# Patient Record
Sex: MALE | Race: WHITE | Employment: OTHER | URBAN - METROPOLITAN AREA
[De-identification: names, ages, dates, MRNs, and addresses within clinical notes are randomized per-mention and may not be internally consistent; named-entity substitution may affect disease eponyms.]

---

## 2018-07-16 ENCOUNTER — HOSPITAL ENCOUNTER (OUTPATIENT)
Dept: NON INVASIVE DIAGNOSTICS | Age: 53
Discharge: HOME | End: 2018-07-16
Payer: COMMERCIAL

## 2018-07-16 ENCOUNTER — HOSPITAL ENCOUNTER (OUTPATIENT)
Dept: NUCLEAR MEDICINE | Age: 53
Discharge: HOME | End: 2018-07-16
Payer: COMMERCIAL

## 2018-07-16 VITALS — HEIGHT: 72 IN | WEIGHT: 250 LBS | BODY MASS INDEX: 33.86 KG/M2

## 2018-07-16 LAB
LV EF: 61 %
LVEF MODALITY: NORMAL

## 2018-07-16 PROCEDURE — A9500 TC99M SESTAMIBI: HCPCS | Performed by: RADIOLOGY

## 2018-07-16 PROCEDURE — 3430000000 HC RX DIAGNOSTIC RADIOPHARMACEUTICAL: Performed by: RADIOLOGY

## 2018-07-16 PROCEDURE — 93017 CV STRESS TEST TRACING ONLY: CPT

## 2018-07-16 PROCEDURE — 78452 HT MUSCLE IMAGE SPECT MULT: CPT

## 2018-07-16 RX ADMIN — Medication 10 MILLICURIE: at 08:38

## 2018-07-16 RX ADMIN — Medication 30 MILLICURIE: at 08:38

## 2019-03-13 ENCOUNTER — TELEPHONE (OUTPATIENT)
Dept: ADMINISTRATIVE | Age: 54
End: 2019-03-13

## 2019-03-15 ENCOUNTER — OFFICE VISIT (OUTPATIENT)
Dept: CARDIOLOGY CLINIC | Age: 54
End: 2019-03-15
Payer: COMMERCIAL

## 2019-03-15 VITALS
HEART RATE: 60 BPM | SYSTOLIC BLOOD PRESSURE: 128 MMHG | RESPIRATION RATE: 16 BRPM | HEIGHT: 72 IN | WEIGHT: 259 LBS | BODY MASS INDEX: 35.08 KG/M2 | DIASTOLIC BLOOD PRESSURE: 82 MMHG

## 2019-03-15 DIAGNOSIS — I10 HYPERTENSION, UNSPECIFIED TYPE: ICD-10-CM

## 2019-03-15 DIAGNOSIS — Z92.89 HISTORY OF NUCLEAR STRESS TEST: ICD-10-CM

## 2019-03-15 DIAGNOSIS — I42.9 CARDIOMYOPATHY, UNSPECIFIED TYPE (HCC): Primary | ICD-10-CM

## 2019-03-15 PROCEDURE — 99244 OFF/OP CNSLTJ NEW/EST MOD 40: CPT | Performed by: INTERNAL MEDICINE

## 2019-03-15 PROCEDURE — 93000 ELECTROCARDIOGRAM COMPLETE: CPT | Performed by: INTERNAL MEDICINE

## 2019-03-15 RX ORDER — LISINOPRIL 20 MG/1
TABLET ORAL
Refills: 1 | COMMUNITY
Start: 2019-03-07 | End: 2021-04-08 | Stop reason: ALTCHOICE

## 2019-03-15 RX ORDER — ROSUVASTATIN CALCIUM 5 MG/1
5 TABLET, COATED ORAL DAILY
Refills: 0 | COMMUNITY
Start: 2019-01-07

## 2019-03-15 RX ORDER — OMEPRAZOLE 40 MG/1
40 CAPSULE, DELAYED RELEASE ORAL
Status: ON HOLD | COMMUNITY
Start: 2019-03-06 | End: 2021-01-02

## 2020-11-18 ENCOUNTER — NURSE ONLY (OUTPATIENT)
Dept: PRIMARY CARE CLINIC | Age: 55
End: 2020-11-18

## 2020-11-18 DIAGNOSIS — Z20.822 ENCOUNTER FOR LABORATORY TESTING FOR COVID-19 VIRUS: ICD-10-CM

## 2020-11-20 LAB
SARS-COV-2: NOT DETECTED
SOURCE: NORMAL

## 2021-01-02 ENCOUNTER — HOSPITAL ENCOUNTER (OUTPATIENT)
Age: 56
Setting detail: OBSERVATION
Discharge: HOME | End: 2021-01-03
Attending: EMERGENCY MEDICINE | Admitting: INTERNAL MEDICINE
Payer: COMMERCIAL

## 2021-01-02 ENCOUNTER — APPOINTMENT (OUTPATIENT)
Dept: GENERAL RADIOLOGY | Age: 56
End: 2021-01-02
Payer: COMMERCIAL

## 2021-01-02 DIAGNOSIS — I48.91 NEW ONSET A-FIB (HCC): Primary | ICD-10-CM

## 2021-01-02 PROBLEM — E78.5 HLD (HYPERLIPIDEMIA): Status: ACTIVE | Noted: 2021-01-02

## 2021-01-02 PROBLEM — K21.9 GASTROESOPHAGEAL REFLUX DISEASE WITHOUT ESOPHAGITIS: Status: ACTIVE | Noted: 2021-01-02

## 2021-01-02 LAB
ANION GAP SERPL CALCULATED.3IONS-SCNC: 6 MMOL/L (ref 7–16)
BASOPHILS ABSOLUTE: 0.03 E9/L (ref 0–0.2)
BASOPHILS RELATIVE PERCENT: 0.4 % (ref 0–2)
BUN BLDV-MCNC: 19 MG/DL (ref 6–20)
CALCIUM SERPL-MCNC: 9.7 MG/DL (ref 8.6–10.2)
CHLORIDE BLD-SCNC: 104 MMOL/L (ref 98–107)
CO2: 25 MMOL/L (ref 22–29)
CREAT SERPL-MCNC: 0.9 MG/DL (ref 0.7–1.2)
EKG ATRIAL RATE: 250 BPM
EKG Q-T INTERVAL: 338 MS
EKG QRS DURATION: 86 MS
EKG QTC CALCULATION (BAZETT): 417 MS
EKG R AXIS: -43 DEGREES
EKG T AXIS: 43 DEGREES
EKG VENTRICULAR RATE: 92 BPM
EOSINOPHILS ABSOLUTE: 0.08 E9/L (ref 0.05–0.5)
EOSINOPHILS RELATIVE PERCENT: 1 % (ref 0–6)
GFR AFRICAN AMERICAN: >60
GFR NON-AFRICAN AMERICAN: >60 ML/MIN/1.73
GLUCOSE BLD-MCNC: 89 MG/DL (ref 74–99)
HCT VFR BLD CALC: 51.4 % (ref 37–54)
HEMOGLOBIN: 16.6 G/DL (ref 12.5–16.5)
IMMATURE GRANULOCYTES #: 0.03 E9/L
IMMATURE GRANULOCYTES %: 0.4 % (ref 0–5)
INR BLD: 0.9
LYMPHOCYTES ABSOLUTE: 1.59 E9/L (ref 1.5–4)
LYMPHOCYTES RELATIVE PERCENT: 19.9 % (ref 20–42)
MCH RBC QN AUTO: 30.9 PG (ref 26–35)
MCHC RBC AUTO-ENTMCNC: 32.3 % (ref 32–34.5)
MCV RBC AUTO: 95.5 FL (ref 80–99.9)
MONOCYTES ABSOLUTE: 0.85 E9/L (ref 0.1–0.95)
MONOCYTES RELATIVE PERCENT: 10.7 % (ref 2–12)
NEUTROPHILS ABSOLUTE: 5.4 E9/L (ref 1.8–7.3)
NEUTROPHILS RELATIVE PERCENT: 67.6 % (ref 43–80)
PDW BLD-RTO: 14.3 FL (ref 11.5–15)
PLATELET # BLD: 245 E9/L (ref 130–450)
PMV BLD AUTO: 9.5 FL (ref 7–12)
POTASSIUM SERPL-SCNC: 4.1 MMOL/L (ref 3.5–5)
PROTHROMBIN TIME: 10.2 SEC (ref 9.3–12.4)
RBC # BLD: 5.38 E12/L (ref 3.8–5.8)
SARS-COV-2, NAAT: NOT DETECTED
SODIUM BLD-SCNC: 135 MMOL/L (ref 132–146)
T4 FREE: 1.13 NG/DL (ref 0.93–1.7)
TROPONIN: <0.01 NG/ML (ref 0–0.03)
TSH SERPL DL<=0.05 MIU/L-ACNC: 1.7 UIU/ML (ref 0.27–4.2)
WBC # BLD: 8 E9/L (ref 4.5–11.5)

## 2021-01-02 PROCEDURE — 71045 X-RAY EXAM CHEST 1 VIEW: CPT

## 2021-01-02 PROCEDURE — G0378 HOSPITAL OBSERVATION PER HR: HCPCS

## 2021-01-02 PROCEDURE — 99284 EMERGENCY DEPT VISIT MOD MDM: CPT

## 2021-01-02 PROCEDURE — 80048 BASIC METABOLIC PNL TOTAL CA: CPT

## 2021-01-02 PROCEDURE — 6370000000 HC RX 637 (ALT 250 FOR IP): Performed by: NURSE PRACTITIONER

## 2021-01-02 PROCEDURE — 2580000003 HC RX 258: Performed by: NURSE PRACTITIONER

## 2021-01-02 PROCEDURE — 93010 ELECTROCARDIOGRAM REPORT: CPT | Performed by: INTERNAL MEDICINE

## 2021-01-02 PROCEDURE — 85610 PROTHROMBIN TIME: CPT

## 2021-01-02 PROCEDURE — 96372 THER/PROPH/DIAG INJ SC/IM: CPT

## 2021-01-02 PROCEDURE — 6360000002 HC RX W HCPCS: Performed by: NURSE PRACTITIONER

## 2021-01-02 PROCEDURE — 84484 ASSAY OF TROPONIN QUANT: CPT

## 2021-01-02 PROCEDURE — 85025 COMPLETE CBC W/AUTO DIFF WBC: CPT

## 2021-01-02 PROCEDURE — 84439 ASSAY OF FREE THYROXINE: CPT

## 2021-01-02 PROCEDURE — 84443 ASSAY THYROID STIM HORMONE: CPT

## 2021-01-02 PROCEDURE — 93005 ELECTROCARDIOGRAM TRACING: CPT | Performed by: EMERGENCY MEDICINE

## 2021-01-02 PROCEDURE — U0002 COVID-19 LAB TEST NON-CDC: HCPCS

## 2021-01-02 PROCEDURE — 36415 COLL VENOUS BLD VENIPUNCTURE: CPT

## 2021-01-02 RX ORDER — ACETAMINOPHEN 325 MG/1
650 TABLET ORAL EVERY 6 HOURS PRN
Status: DISCONTINUED | OUTPATIENT
Start: 2021-01-02 | End: 2021-01-03 | Stop reason: HOSPADM

## 2021-01-02 RX ORDER — SODIUM CHLORIDE 0.9 % (FLUSH) 0.9 %
10 SYRINGE (ML) INJECTION PRN
Status: DISCONTINUED | OUTPATIENT
Start: 2021-01-02 | End: 2021-01-03 | Stop reason: HOSPADM

## 2021-01-02 RX ORDER — ACETAMINOPHEN 650 MG/1
650 SUPPOSITORY RECTAL EVERY 6 HOURS PRN
Status: DISCONTINUED | OUTPATIENT
Start: 2021-01-02 | End: 2021-01-03 | Stop reason: HOSPADM

## 2021-01-02 RX ORDER — LISINOPRIL 20 MG/1
20 TABLET ORAL DAILY
Status: DISCONTINUED | OUTPATIENT
Start: 2021-01-03 | End: 2021-01-03 | Stop reason: HOSPADM

## 2021-01-02 RX ORDER — ONDANSETRON 2 MG/ML
4 INJECTION INTRAMUSCULAR; INTRAVENOUS EVERY 6 HOURS PRN
Status: DISCONTINUED | OUTPATIENT
Start: 2021-01-02 | End: 2021-01-03 | Stop reason: HOSPADM

## 2021-01-02 RX ORDER — CLONAZEPAM 0.5 MG/1
0.5 TABLET ORAL DAILY
Status: DISCONTINUED | OUTPATIENT
Start: 2021-01-03 | End: 2021-01-03 | Stop reason: HOSPADM

## 2021-01-02 RX ORDER — PANTOPRAZOLE SODIUM 40 MG/1
40 TABLET, DELAYED RELEASE ORAL
Status: DISCONTINUED | OUTPATIENT
Start: 2021-01-03 | End: 2021-01-03 | Stop reason: HOSPADM

## 2021-01-02 RX ORDER — OMEPRAZOLE 20 MG/1
40 CAPSULE, DELAYED RELEASE ORAL EVERY MORNING
Status: DISCONTINUED | OUTPATIENT
Start: 2021-01-03 | End: 2021-01-02 | Stop reason: CLARIF

## 2021-01-02 RX ORDER — SODIUM CHLORIDE 0.9 % (FLUSH) 0.9 %
10 SYRINGE (ML) INJECTION PRN
Status: DISCONTINUED | OUTPATIENT
Start: 2021-01-02 | End: 2021-01-02 | Stop reason: SDUPTHER

## 2021-01-02 RX ORDER — 0.9 % SODIUM CHLORIDE 0.9 %
1000 INTRAVENOUS SOLUTION INTRAVENOUS ONCE
Status: DISCONTINUED | OUTPATIENT
Start: 2021-01-02 | End: 2021-01-03 | Stop reason: HOSPADM

## 2021-01-02 RX ORDER — POLYETHYLENE GLYCOL 3350 17 G/17G
17 POWDER, FOR SOLUTION ORAL DAILY PRN
Status: DISCONTINUED | OUTPATIENT
Start: 2021-01-02 | End: 2021-01-03 | Stop reason: HOSPADM

## 2021-01-02 RX ORDER — PROMETHAZINE HYDROCHLORIDE 25 MG/1
12.5 TABLET ORAL EVERY 6 HOURS PRN
Status: DISCONTINUED | OUTPATIENT
Start: 2021-01-02 | End: 2021-01-03 | Stop reason: HOSPADM

## 2021-01-02 RX ORDER — M-VIT,TX,IRON,MINS/CALC/FOLIC 27MG-0.4MG
1 TABLET ORAL DAILY
Status: DISCONTINUED | OUTPATIENT
Start: 2021-01-03 | End: 2021-01-03 | Stop reason: HOSPADM

## 2021-01-02 RX ORDER — ROSUVASTATIN CALCIUM 5 MG/1
5 TABLET, COATED ORAL NIGHTLY
Status: DISCONTINUED | OUTPATIENT
Start: 2021-01-02 | End: 2021-01-03 | Stop reason: HOSPADM

## 2021-01-02 RX ORDER — LISINOPRIL 20 MG/1
1 TABLET ORAL DAILY
Status: DISCONTINUED | OUTPATIENT
Start: 2021-01-02 | End: 2021-01-02

## 2021-01-02 RX ORDER — HYDROCODONE BITARTRATE AND ACETAMINOPHEN 7.5; 325 MG/1; MG/1
1 TABLET ORAL EVERY 12 HOURS PRN
Status: DISCONTINUED | OUTPATIENT
Start: 2021-01-02 | End: 2021-01-03 | Stop reason: HOSPADM

## 2021-01-02 RX ORDER — SODIUM CHLORIDE 0.9 % (FLUSH) 0.9 %
10 SYRINGE (ML) INJECTION EVERY 12 HOURS SCHEDULED
Status: DISCONTINUED | OUTPATIENT
Start: 2021-01-02 | End: 2021-01-03 | Stop reason: HOSPADM

## 2021-01-02 RX ADMIN — ENOXAPARIN SODIUM 100 MG: 100 INJECTION SUBCUTANEOUS at 17:01

## 2021-01-02 RX ADMIN — ROSUVASTATIN CALCIUM 5 MG: 5 TABLET, FILM COATED ORAL at 21:21

## 2021-01-02 RX ADMIN — Medication 10 ML: at 21:21

## 2021-01-02 ASSESSMENT — ENCOUNTER SYMPTOMS
COUGH: 0
BACK PAIN: 0
NAUSEA: 0
SINUS PRESSURE: 0
DIARRHEA: 0
WHEEZING: 0
EYE PAIN: 0
VOMITING: 0
SORE THROAT: 0
ABDOMINAL PAIN: 0
EYE REDNESS: 0
EYE DISCHARGE: 0
SHORTNESS OF BREATH: 0

## 2021-01-02 ASSESSMENT — PAIN SCALES - GENERAL
PAINLEVEL_OUTOF10: 0
PAINLEVEL_OUTOF10: 0

## 2021-01-02 NOTE — ED PROVIDER NOTES
HPI   26-year-old male patient presents to the emergency department with fluttering sensation in his chest for the last few days. Patient says he has had palpitations in the past maybe 3 or more years ago and had a Holter monitor ordered by Dr. Jim Jacome which did not show anything that time. Patient says the fluttering sensation in his chest is mild, intermittent, nonradiating. There is no associated chest pain, shortness of breath, fever. Patient says over the last few days that he has been feeling somewhat fatigued and coincidentally noticed a fluttering sensation at the same time. He has a history of hypertension, denies any history of CHF or stroke. He is not on any anticoagulation currently. Patient has had 2D echo and stress test in the past.  He has a history of non-Hodgkin's lymphoma. Review of Systems   Constitutional: Negative for chills and fever. HENT: Negative for ear pain, sinus pressure and sore throat. Eyes: Negative for pain, discharge and redness. Respiratory: Negative for cough, shortness of breath and wheezing. Cardiovascular: Negative for chest pain. Gastrointestinal: Negative for abdominal pain, diarrhea, nausea and vomiting. Genitourinary: Negative for dysuria and frequency. Musculoskeletal: Negative for arthralgias and back pain. Skin: Negative for rash and wound. Neurological: Negative for weakness and headaches. Hematological: Negative for adenopathy. All other systems reviewed and are negative. Physical Exam  Vitals signs and nursing note reviewed. Constitutional:       General: He is not in acute distress. Appearance: He is well-developed. Comments: Patient is overweight   HENT:      Head: Normocephalic and atraumatic. Eyes:      Conjunctiva/sclera: Conjunctivae normal.   Neck:      Musculoskeletal: Normal range of motion and neck supple. Cardiovascular:      Heart sounds: Normal heart sounds. No murmur.       Comments: Irregularly irregular rhythm  Pulmonary:      Effort: Pulmonary effort is normal. No respiratory distress. Breath sounds: Normal breath sounds. No wheezing or rales. Abdominal:      General: Bowel sounds are normal.      Palpations: Abdomen is soft. Tenderness: There is no abdominal tenderness. There is no guarding or rebound. Musculoskeletal:         General: No tenderness or deformity. Skin:     General: Skin is warm and dry. Neurological:      Mental Status: He is alert and oriented to person, place, and time. Cranial Nerves: No cranial nerve deficit. Coordination: Coordination normal.          Procedures     MDM   75-year-old male patient presents to the emergency department with feeling off for the last few days. Patient noted fluttering sensation in his chest.  Here in the emergency department EKG showing atrial fibrillation 92 bpm.  This is new onset as compared to his prior EKG approximately a year ago. Patient currently not hypotensive saturating well on room air. Troponin and TSH within range as well as the rest of patient's blood work appears to be normal.  Chest x-ray negative. Patient here received fluids, he has a [id number] score of 1. Spoke with patient's primary care physician Dr. Tunde Christopher who would like patient admitted for telemetry observation so that he may be seen by cardiology in the morning. Rapid Covid negative. EKG: This EKG is signed and interpreted by me. Rate: 92  Rhythm: Atrial fibrillation  Interpretation: atrial fibrillation (new onset), normal QT left axis deviation no ST elevation. Comparison: New onset atrial fibrillation as compared to prior EKG. ED Course as of Jan 02 1349   Sat Jan 02, 2021   1315 Dr. Manohar Patel with Dr. Tunde Christopher over the phone who would like the patient admitted for observation overnight to the hospital for new onset A. fib.     [FG]      ED Course User Index  [FG] Krishna Cruz DO      ED Course as of Jan 02 1434   Sat Jan 02, 2021 1315 Dr. Lenard Falcon with Dr. Indy Rivero over the phone who would like the patient admitted for observation overnight to the hospital for new onset A. fib. [FG]      ED Course User Index  [FG] Adwoa Ko, DO       --------------------------------------------- PAST HISTORY ---------------------------------------------  Past Medical History:  has a past medical history of Cancer ([facility] [address] Hyperlipidemia, Hypertension, and Overactive thyroid gland. Past Surgical History:  has a past surgical history that includes joint replacement (Right, 2017) and other surgical history (2012). Social History:  reports that he quit smoking about 13 years ago. His smokeless tobacco use includes snuff. He reports that he does not drink alcohol or use drugs. Family History: family history is not on file. The patients home medications have been reviewed. Allergies: Patient has no known allergies.     -------------------------------------------------- RESULTS -------------------------------------------------    LABS:  Results for orders placed or performed during the hospital encounter of 01/02/21   CBC Auto Differential   Result Value Ref Range    WBC 8.0 4.5 - 11.5 E9/L    RBC 5.38 3.80 - 5.80 E12/L    Hemoglobin 16.6 (H) 12.5 - 16.5 g/dL    Hematocrit 51.4 37.0 - 54.0 %    MCV 95.5 80.0 - 99.9 fL    MCH 30.9 26.0 - 35.0 pg    MCHC 32.3 32.0 - 34.5 %    RDW 14.3 11.5 - 15.0 fL    Platelets 866 707 - 650 E9/L    MPV 9.5 7.0 - 12.0 fL    Neutrophils % 67.6 43.0 - 80.0 %    Immature Granulocytes % 0.4 0.0 - 5.0 %    Lymphocytes % 19.9 (L) 20.0 - 42.0 %    Monocytes % 10.7 2.0 - 12.0 %    Eosinophils % 1.0 0.0 - 6.0 %    Basophils % 0.4 0.0 - 2.0 %    Neutrophils Absolute 5.40 1.80 - 7.30 E9/L    Immature Granulocytes # 0.03 E9/L    Lymphocytes Absolute 1.59 1.50 - 4.00 E9/L    Monocytes Absolute 0.85 0.10 - 0.95 E9/L    Eosinophils Absolute 0.08 0.05 - 0.50 E9/L    Basophils Absolute 0.03 0.00 - 0.20 E9/L   Basic Metabolic Panel   Result Value Ref Range    Sodium 135 132 - 146 mmol/L    Potassium 4.1 3.5 - 5.0 mmol/L    Chloride 104 98 - 107 mmol/L    CO2 25 22 - 29 mmol/L    Anion Gap 6 (L) 7 - 16 mmol/L    Glucose 89 74 - 99 mg/dL    BUN 19 6 - 20 mg/dL    CREATININE 0.9 0.7 - 1.2 mg/dL    GFR Non-African American >60 >=60 mL/min/1.73    GFR African American >60     Calcium 9.7 8.6 - 10.2 mg/dL   Troponin   Result Value Ref Range    Troponin <0.01 0.00 - 0.03 ng/mL   TSH without Reflex   Result Value Ref Range    TSH 1.700 0.270 - 4.200 uIU/mL   T4, FREE   Result Value Ref Range    T4 Free 1.13 0.93 - 1.70 ng/dL   COVID-19   Result Value Ref Range    SARS-CoV-2, NAAT Not Detected Not Detected   Protime-INR   Result Value Ref Range    Protime 10.2 9.3 - 12.4 sec    INR 0.9    EKG 12 Lead   Result Value Ref Range    Ventricular Rate 92 BPM    Atrial Rate 250 BPM    QRS Duration 86 ms    Q-T Interval 338 ms    QTc Calculation (Bazett) 417 ms    R Axis -43 degrees    T Axis 43 degrees       RADIOLOGY:  XR CHEST PORTABLE   Final Result   No acute process. ------------------------- NURSING NOTES AND VITALS REVIEWED ---------------------------  Date / Time Roomed:  1/2/2021 12:17 PM  ED Bed Assignment:  15/15    The nursing notes within the ED encounter and vital signs as below have been reviewed.      Patient Vitals for the past 24 hrs:   BP Temp Temp src Pulse Resp SpO2 Height Weight   01/02/21 1420 121/78 -- -- 93 16 98 % -- --   01/02/21 1238 113/81 -- -- 87 16 98 % -- --   01/02/21 1215 (!) 150/103 98 °F (36.7 °C) Temporal 93 14 98 % 6' (1.829 m) 228 lb (103.4 kg)       Oxygen Saturation Interpretation: Normal    ------------------------------------------ PROGRESS NOTES ------------------------------------------  Re-evaluation(s):  Time: 200  Patients symptoms show no change  Repeat physical examination is not changed    Counseling:  I have spoken with the patient and discussed todays results, in addition to providing specific details for the plan of care and counseling regarding the diagnosis and prognosis. Their questions are answered at this time and they are agreeable with the plan of admission.    --------------------------------- ADDITIONAL PROVIDER NOTES ---------------------------------  Consultations:  Time: 130. Spoke with Robyn for Dr. Stephanie Chen. Discussed case. They will admit the patient. This patient's ED course included: a personal history and physicial examination, re-evaluation prior to disposition and cardiac monitoring    This patient has remained hemodynamically stable during their ED course. Diagnosis:  1. New onset a-fib [facility])        Disposition:  Patient's disposition: Admit to telemetry  Patient's condition is stable.            Lea Shrestha DO  Resident  01/02/21 4118

## 2021-01-03 VITALS
OXYGEN SATURATION: 98 % | BODY MASS INDEX: 30.88 KG/M2 | SYSTOLIC BLOOD PRESSURE: 133 MMHG | HEIGHT: 72 IN | TEMPERATURE: 97.3 F | RESPIRATION RATE: 18 BRPM | DIASTOLIC BLOOD PRESSURE: 96 MMHG | HEART RATE: 71 BPM | WEIGHT: 228 LBS

## 2021-01-03 LAB
ANION GAP SERPL CALCULATED.3IONS-SCNC: 9 MMOL/L (ref 7–16)
BUN BLDV-MCNC: 18 MG/DL (ref 6–20)
CALCIUM SERPL-MCNC: 9.4 MG/DL (ref 8.6–10.2)
CHLORIDE BLD-SCNC: 103 MMOL/L (ref 98–107)
CO2: 23 MMOL/L (ref 22–29)
CREAT SERPL-MCNC: 0.9 MG/DL (ref 0.7–1.2)
EKG ATRIAL RATE: 60 BPM
EKG P AXIS: 43 DEGREES
EKG P-R INTERVAL: 156 MS
EKG Q-T INTERVAL: 404 MS
EKG QRS DURATION: 96 MS
EKG QTC CALCULATION (BAZETT): 404 MS
EKG R AXIS: -44 DEGREES
EKG T AXIS: 34 DEGREES
EKG VENTRICULAR RATE: 60 BPM
GFR AFRICAN AMERICAN: >60
GFR NON-AFRICAN AMERICAN: >60 ML/MIN/1.73
GLUCOSE BLD-MCNC: 88 MG/DL (ref 74–99)
HCT VFR BLD CALC: 51.2 % (ref 37–54)
HEMOGLOBIN: 16.5 G/DL (ref 12.5–16.5)
MCH RBC QN AUTO: 30.9 PG (ref 26–35)
MCHC RBC AUTO-ENTMCNC: 32.2 % (ref 32–34.5)
MCV RBC AUTO: 95.9 FL (ref 80–99.9)
PDW BLD-RTO: 14.2 FL (ref 11.5–15)
PLATELET # BLD: 242 E9/L (ref 130–450)
PMV BLD AUTO: 9.1 FL (ref 7–12)
POTASSIUM REFLEX MAGNESIUM: 4.2 MMOL/L (ref 3.5–5)
RBC # BLD: 5.34 E12/L (ref 3.8–5.8)
SODIUM BLD-SCNC: 135 MMOL/L (ref 132–146)
WBC # BLD: 7.6 E9/L (ref 4.5–11.5)

## 2021-01-03 PROCEDURE — 6360000002 HC RX W HCPCS: Performed by: NURSE PRACTITIONER

## 2021-01-03 PROCEDURE — 96372 THER/PROPH/DIAG INJ SC/IM: CPT

## 2021-01-03 PROCEDURE — 80048 BASIC METABOLIC PNL TOTAL CA: CPT

## 2021-01-03 PROCEDURE — 36415 COLL VENOUS BLD VENIPUNCTURE: CPT

## 2021-01-03 PROCEDURE — 6370000000 HC RX 637 (ALT 250 FOR IP): Performed by: NURSE PRACTITIONER

## 2021-01-03 PROCEDURE — 2580000003 HC RX 258: Performed by: NURSE PRACTITIONER

## 2021-01-03 PROCEDURE — 99215 OFFICE O/P EST HI 40 MIN: CPT | Performed by: INTERNAL MEDICINE

## 2021-01-03 PROCEDURE — APPSS60 APP SPLIT SHARED TIME 46-60 MINUTES: Performed by: NURSE PRACTITIONER

## 2021-01-03 PROCEDURE — G0378 HOSPITAL OBSERVATION PER HR: HCPCS

## 2021-01-03 PROCEDURE — 93005 ELECTROCARDIOGRAM TRACING: CPT | Performed by: NURSE PRACTITIONER

## 2021-01-03 PROCEDURE — 85027 COMPLETE CBC AUTOMATED: CPT

## 2021-01-03 PROCEDURE — 93010 ELECTROCARDIOGRAM REPORT: CPT | Performed by: INTERNAL MEDICINE

## 2021-01-03 RX ADMIN — CLONAZEPAM 0.5 MG: 0.5 TABLET ORAL at 08:47

## 2021-01-03 RX ADMIN — Medication 1 TABLET: at 08:41

## 2021-01-03 RX ADMIN — LISINOPRIL 20 MG: 20 TABLET ORAL at 08:41

## 2021-01-03 RX ADMIN — ENOXAPARIN SODIUM 100 MG: 100 INJECTION SUBCUTANEOUS at 05:53

## 2021-01-03 RX ADMIN — Medication 10 ML: at 08:48

## 2021-01-03 RX ADMIN — METOPROLOL TARTRATE 25 MG: 25 TABLET, FILM COATED ORAL at 09:26

## 2021-01-03 ASSESSMENT — PAIN SCALES - GENERAL
PAINLEVEL_OUTOF10: 0
PAINLEVEL_OUTOF10: 0

## 2021-01-03 NOTE — PROGRESS NOTES
Physical Therapy  PT orders received. Pt off floor, but RN reports pt completely independent with mobility. Will discharge order.

## 2021-01-03 NOTE — H&P
History and Physical      CHIEF COMPLAINT: Palpitations    History of Present Illness: 49-year-old male patient of Dr. Mariana Angeles I am asked to admit and follow. Patient states approximately 2 to 3 days prior to this admission he noticed onset of palpitations. He has a history of \"palpitations\" in the past has been seen by cardiology had various monitors but no significant arrhythmias. Upon presentation to the ED he was found to be in atrial fibrillation with a ventricular rate of 92/min. Chest x-ray was negative. SPO2 90% on room air. Blood pressure admission 150/103; currently 133/96. Troponin less than 0.01. TSH 1.700 free T4 1.13. Hemoglobin 16.6 potassium 4.1. SARS-CoV-2 was negative. Patient was seen by cardiology; patient had spontaneous conversion to sinus rhythm has been cleared for discharge. --Patient states he began smoking approximately age 8 quit approximately age 34, averaging 1 pack a day. He is now using chewing tobacco.  He is advised to cease all tobacco products. --He states recently he has increased his caffeine intake at least 3 cups of coffee per day which is unusual for him. 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 --History of non-Hodgkin's lymphoma first diagnosed ; has been receiving treatment at [facility]. In 2019 there was a small recurrence; recent checkup in  no evidence of recurrence. --Past history otherwise negative for rheumatic fever scarlet fever polio diphtheria diabetes  --Mother alive age 80 healthy; father  age 80 unknown cause  --Denies any recurring heart difficulties except the palpitations as listed above. One episode of bronchitis in his life no recurring dyspnea problems.   --Does have GERD; no history of kidney stones kidney infections blood in stool blood in urine        Past Medical History:   Diagnosis Date    [facility]     Gastroesophageal reflux disease without esophagitis 1/2/2021    HTN (hypertension) 3/15/2019    Hyperlipidemia     Hypertension     New onset a-fib ([facility] [address] 1/2/2021    NHL (non-Hodgkin's lymphoma) ([facility] 2004    [facility]    Overactive thyroid gland          Past Surgical History:   Procedure Laterality Date    JOINT REPLACEMENT Right 2017    OTHER SURGICAL HISTORY  2012    lymph node extraction       Medications Prior to Admission:    Medications Prior to Admission: lisinopril (PRINIVIL;ZESTRIL) 20 MG tablet, TAKE 1 TABLET BY MOUTH EVERY DAY  rosuvastatin (CRESTOR) 5 MG tablet, TAKE 1 TABLET BY MOUTH EVERY DAY  Multiple Vitamins-Minerals (ONE-A-DAY MENS HEALTH FORMULA PO), Take by mouth daily  NONFORMULARY, 1 tablet nightly Indications: Rem Fresh  [DISCONTINUED] omeprazole (PRILOSEC) 40 MG delayed release capsule, Take 40 mg by mouth  clonazePAM (KLONOPIN) 0.5 MG tablet, Take 0.5 mg by mouth daily. [DISCONTINUED] HYDROcodone-acetaminophen (LORTAB) 7.5-500 MG per tablet, Take 1 tablet by mouth 2 times daily. Allergies:    Patient has no known allergies. Social History:    reports that he quit smoking about 18 years ago. His smoking use included cigarettes. He started smoking about 50 years ago. He has a 33.00 pack-year smoking history. His smokeless tobacco use includes snuff. He reports that he does not drink alcohol or use drugs. Family History:   family history includes Other in his father and mother. REVIEW OF SYSTEMS:  As above in the HPI, otherwise negative    PHYSICAL EXAM:    VS: BP (!) 133/96   Pulse 71   Temp 97.3 °F (36.3 °C) (Axillary)   Resp 18   Ht 6' (1.829 m)   Wt 228 lb (103.4 kg)   SpO2 98%   BMI 30.92 kg/m²     General appearance: Alert, Awake, Oriented times 3, no distress  Skin: Warm and dry ; no rashes  Head: Normocephalic. No masses, lesions or tenderness noted  Eyes: Conjunctivae pink, sclera white. PERRL,EOM-I  Ears: External ears normal  Nose/Sinuses: Nares normal. Septum midline.  Mucosa normal. No drainage  Oropharynx: Oropharynx clear with no exudate seen  Neck: Supple. No jugular venous distension, lymphadenopathy or thyromegaly Trachea midline  Lungs: Clear to auscultation bilaterally. No rhonchi, crackles or wheezes  Heart: S1 S2  Regular rate and rhythm. No rub, murmur or gallop  Abdomen: Soft, non-tender. BS normal. No masses, organomegaly; no rebound or guarding  Extremities: No edema, Peripheral pulses palpable  Musculoskeletal: Muscular strength appears intact. Neuro:  No focal motor defects ; II-XII grossly intact .  MARTELL equally  Breast: deferred  Rectal: deferred  Genitalia:  deferred    LABS:  CBC:   Lab Results   Component Value Date    WBC 7.6 01/03/2021    RBC 5.34 01/03/2021    HGB 16.5 01/03/2021    HCT 51.2 01/03/2021    MCV 95.9 01/03/2021    MCH 30.9 01/03/2021    MCHC 32.2 01/03/2021    RDW 14.2 01/03/2021     01/03/2021    MPV 9.1 01/03/2021     CBC with Differential:    Lab Results   Component Value Date    WBC 7.6 01/03/2021    RBC 5.34 01/03/2021    HGB 16.5 01/03/2021    HCT 51.2 01/03/2021     01/03/2021    MCV 95.9 01/03/2021    MCH 30.9 01/03/2021    MCHC 32.2 01/03/2021    RDW 14.2 01/03/2021    LYMPHOPCT 19.9 01/02/2021    MONOPCT 10.7 01/02/2021    BASOPCT 0.4 01/02/2021    MONOSABS 0.85 01/02/2021    LYMPHSABS 1.59 01/02/2021    EOSABS 0.08 01/02/2021    BASOSABS 0.03 01/02/2021     Hemoglobin/Hematocrit:    Lab Results   Component Value Date    HGB 16.5 01/03/2021    HCT 51.2 01/03/2021     CMP:    Lab Results   Component Value Date     01/03/2021    K 4.2 01/03/2021     01/03/2021    CO2 23 01/03/2021    BUN 18 01/03/2021    CREATININE 0.9 01/03/2021    GFRAA >60 01/03/2021    LABGLOM >60 01/03/2021    GLUCOSE 88 01/03/2021    CALCIUM 9.4 01/03/2021     BMP:    Lab Results   Component Value Date     01/03/2021    K 4.2 01/03/2021     01/03/2021    CO2 23 01/03/2021    BUN 18 01/03/2021    CREATININE 0.9 01/03/2021    CALCIUM 9.4 01/03/2021    GFRAA >60 01/03/2021    LABGLOM >60 01/03/2021    GLUCOSE 88 01/03/2021     Hepatic Function Panel:  No results found for: ALKPHOS, ALT, AST, PROT, BILITOT, BILIDIR, IBILI, LABALBU  Magnesium:  No results found for: MG  Phosphorus:  No results found for: PHOS  PT/INR:    Lab Results   Component Value Date    PROTIME 10.2 01/02/2021    INR 0.9 01/02/2021     Troponin:    Lab Results   Component Value Date    TROPONINI <0.01 01/02/2021     U/A:  No results found for: NITRITE, COLORU, PROTEINU, PHUR, LABCAST, WBCUA, RBCUA, MUCUS, TRICHOMONAS, YEAST, BACTERIA, CLARITYU, SPECGRAV, LEUKOCYTESUR, UROBILINOGEN, BILIRUBINUR, BLOODU, GLUCOSEU, AMORPHOUS  HgBA1c:  No results found for: LABA1C  FLP:  No results found for: TRIG, HDL, LDLCALC, LDLDIRECT, LABVLDL  TSH:    Lab Results   Component Value Date    TSH 1.700 01/02/2021     VITAMIN B12: No components found for: B12  FOLATE:  No results found for: FOLATE    RADIOLOGY:  XR CHEST PORTABLE   Final Result   No acute process.           ASSESSMENT:      Active Hospital Problems    Diagnosis    New onset a-fib ([facility] [address] [I48.91]    HLD (hyperlipidemia) [E78.5]    Gastroesophageal reflux disease without esophagitis [K21.9]    HTN (hypertension) [I10]    NHL (non-Hodgkin's lymphoma) (HCC) [C85.90]       PLAN:  Medications discussed with patient  GI prophylaxis  DVT prophylaxis  Consultants notes reviewed  Continue rosuvastatin 5 mg daily  Metoprolol tartrate 12.5 mg twice daily  Lisinopril 5 mg daily  Apixaban 5 mg twice daily  Clonazepam 0.5 mg daily  Follow-up Dr. Tunde Christopher 1 week  Follow-up with cardiology per their instructions  Discharge home today          Please note that over 50 minutes was spent in evaluating the patient, review of records and results, discussion with staff/family, etc.    [address]  2:22 PM  1/3/2021    Voice recognition software use for dictation

## 2021-01-03 NOTE — CONSULTS
Inpatient Cardiology Consultation      Reason for Consult: New onset of Afib with RVR    Consulting Physician: Dr. Danielle Rios    Requesting Physician:  Dr. Branden Garcia    Date of Consultation: 1/3/2021    HISTORY OF PRESENT ILLNESS:   Mr. Shahbaz Olvera is a 47-year-old obese  male who was originally seen by Dr. Danielle Rios (11/2012)-->bradycardia--> underwent a 24-hour event monitor showing average heart rate 67 bpm (with range of 41 bpm while sleeping) with max heart rate 157 bpm while hunting). Patient having normal echocardiogram 11/2012 showing mild TR, LVEF 55-60%. More recently patient was seen by Dr. Benson Wolf 3/2019 for evaluation of \"tachycardia\" with an isolated heart rate of 100 bpm.  Patient was ordered an echocardiogram that is yet to be done. PMH: Hypertension, hyperlipidemia, current tobacco use (chewing tobacco) and former cigarette use, history of non-Hodgkin's lymphoma (see details below), GERD, anxiety/depression. Patient reported that he was in his normal state of health approximately 2 days prior to admission. He stated that he suddenly felt his heart racing and skipping a beat intermittently, not related to rest or exertion. He has had occasional shortness of breath with exertion without lower extremity edema, abdominal bloating, early satiety, orthopnea or PND. He occasionally feels lightheaded and dizzy when bending over to pick something up /chronic and reports having the symptoms since he was a teenager. Denies syncope or near syncope. Denies fever, cough, chills, nausea or vomiting. Due to his heart racing he presented to ER on 1/2/2021 for further evaluation and management. Upon arrival to the ED: Blood pressure 150/103, heart rate 93, afebrile, 98% on room air. Labs: Troponin negative x1, TSH normal.  BUN 19, creatinine 0.9, WBC 8.0, H/H stable, platelet count 176. Covid-19: Negative. Chest x-ray: No acute process.   EKG: Afib, left axis deviation, rate 92 bpm.  Patient was admitted to a telemetry monitoring unit and was given an IV fluid bolus 1 L. Cardiology was consulted for new onset of A. fib with RVR. Currently patient is sitting up in his room on a couch. He denies chest pain and shortness of breath. He does report feeling more tired than normal.  Telemetry monitor showing A. fib with intermittent RVR (with heart rates ranging from 110-140 bpm). Please note: past medical records were reviewed per electronic medical record (EMR) - see detailed reports under Past Medical/ Surgical History. Past Medical History:    1. Hypertension  2. Hyperlipidemia: On Crestor  3. Reported sleep study showing no obstructive sleep apnea  4. Tobacco abuse: Quit smoking cigarettes in 2008 (8-pack-year smoker), chewing tobacco use (1 can lasting approximately 2 days; x10 years). 5.  History of non-Hodgkin's lymphoma: Treated with R-CHOP (increased fluids Adriamycin) and Rituxan. · Stage III, grade 2, follicular lymphoma diagnosed August 2004 s/p R-CHOP x6 and then131-iodine tositumomab, [place] in March 2005 ([place]); relapsed with grade 1-2 follicular lymphoma involving a single left inguinal node, which was completely excised in September 2012, s/p rituximab weekly x4, completed in October 2012 ([place]); relapsed with grade 1-2 follicular lymphoma involving a single left inguinal lymph node, s/p complete excision in April 2016 and then rituximab weekly x4, completed in May 2016 ([place]); progressed in March 2019, s/p 2400 cGy radiation therapy to the left inguinal region, completed in May 2019 ([place]). · Per hematology/oncology note in outpatient 6/1/9457 [facility]: Follicular lymphoma is stable. Plan for follow-up in 1/2021.  6.  Bradycardia  · 24-hour event monitor 10/12/2012 showing average heart rate 67 bpm, minimum heart rate 41 bpm (at 3:30 AM while sleeping), max heart rate 157 bpm (while hunting), PVCs (1.51% of all beats), and PAC (0.73% of all beats).   7.  Echocardiogram: 11/7/2012: EF 55-60%, pulmonary artery systolic pressure is normal, normal diastolic function, trace MR, mild pulmonic regurgitation, mild TR, global right ventricular systolic function is normal.   8.  Exercise MPS: 7/16/2018 showing LVEF 61%, no exercise-induced perfusion defect, 85% target heart rate: 140 bpm.  [place] > 5.   9.  OP visit Dr. Jameel Green 3/15/2019 for evaluation of tachycardia. Per Dr. Jameel Green, isolated heart rate of 100 bpm, especially in light of visit to oncology for recurrent cancer is within normal limits. Patient was recommended to have an outpatient echocardiogram --has had to be completed. 10.  GERD: On Protonix  11. New onset Afib with RVR (duration unknown, captured on EKG 1/2/2021) heart rate 110-140, started on Lopressor 25 twice daily.: [id number] score at least 1 (HTN). Scheduled for DARRON/DCCV 1/4/2021. 12. Anxiety/depression. 13.  Obesity: BMI 30.9    Medications Prior to admit:  Prior to Admission medications    Medication Sig Start Date End Date Taking? Authorizing Provider   lisinopril (PRINIVIL;ZESTRIL) 20 MG tablet TAKE 1 TABLET BY MOUTH EVERY DAY 3/7/19   Historical Provider, MD   rosuvastatin (CRESTOR) 5 MG tablet TAKE 1 TABLET BY MOUTH EVERY DAY 1/7/19   Historical Provider, MD   Multiple Vitamins-Minerals (ONE-A-DAY [facility] FORMULA PO) Take by mouth daily    Historical Provider, MD   NONFORMULARY 1 tablet nightly Indications: Rem Fresh    Historical Provider, MD   clonazePAM (KLONOPIN) 0.5 MG tablet Take 0.5 mg by mouth daily.       Historical Provider, MD       Current Medications:    Current Facility-Administered Medications: 0.9 % sodium chloride bolus, 1,000 mL, Intravenous, Once  sodium chloride flush 0.9 % injection 10 mL, 10 mL, Intravenous, 2 times per day  sodium chloride flush 0.9 % injection 10 mL, 10 mL, Intravenous, PRN  enoxaparin (LOVENOX) injection 100 mg, 1 mg/kg, Subcutaneous, BID  promethazine (PHENERGAN) tablet 12.5 mg, 12.5 mg, Oral, Q6H PRN **OR** ondansetron [facility]) injection 4 mg, 4 mg, Intravenous, Q6H PRN  polyethylene glycol (GLYCOLAX) packet 17 g, 17 g, Oral, Daily PRN  acetaminophen (TYLENOL) tablet 650 mg, 650 mg, Oral, Q6H PRN **OR** acetaminophen (TYLENOL) suppository 650 mg, 650 mg, Rectal, Q6H PRN  clonazePAM (KLONOPIN) tablet 0.5 mg, 0.5 mg, Oral, Daily  HYDROcodone-acetaminophen (NORCO) 7.5-325 MG per tablet 1 tablet, 1 tablet, Oral, Q12H PRN  therapeutic multivitamin-minerals 1 tablet, 1 tablet, Oral, Daily  rosuvastatin (CRESTOR) tablet 5 mg, 5 mg, Oral, Nightly  lisinopril (PRINIVIL;ZESTRIL) tablet 20 mg, 20 mg, Oral, Daily  pantoprazole (PROTONIX) tablet 40 mg, 40 mg, Oral, QAM AC    Allergies:  Patient has no known allergies. Social History:    Patient lives with his wife. He is a retired  and is currently on disability. He denies alcohol and illicit drug use. He is a former cigarette smoker: Quit 2008 (8-pack-year smoker). He currently chews tobacco (1 can lasting 1.5 days) x10 years. Family History: Patient denies any family history significant for premature CAD. REVIEW OF SYSTEMS:     · Constitutional: + fatigue. Denies fevers, chills or night sweats  · Eyes: Denies visual changes or drainage  · ENT: Denies headaches or hearing loss. No mouth sores or sore throat. No epistaxis   · Cardiovascular: See HPI. No lower extremity swelling. · Respiratory: See HPI. Denies cough, orthopnea or PND. No hemoptysis   · Gastrointestinal: Denies hematemesis or anorexia. No hematochezia or melena    · Genitourinary: Denies urgency, dysuria or hematuria. · Musculoskeletal: Denies gait disturbance, weakness or joint complaints  · Integumentary: Denies rash, hives or pruritis   · Neurological: Denies dizziness, headaches or seizures. No numbness or tingling  · Psychiatric: Denies anxiety or depression. · Endocrine: Denies temperature intolerance. No recent weight change. .  · Hematologic/Lymphatic: Denies abnormal bruising or bleeding. No swollen lymph nodes    PHYSICAL EXAM:   /76   Pulse 75   Temp 97.6 °F (36.4 °C) (Oral)   Resp 18   Ht 6' (1.829 m)   Wt 228 lb (103.4 kg)   SpO2 95%   BMI 30.92 kg/m²   CONST:  Well developed, well nourished middle-aged obese  male who appears of stated age. Awake, alert and cooperative. No apparent distress. HEENT:   Head- Normocephalic, atraumatic   Eyes- Conjunctivae pink, anicteric  Throat- Oral mucosa pink and moist  Neck-  No stridor, trachea midline, no jugular venous distention. No carotid bruit. CHEST: Chest symmetrical and non-tender to palpation. No accessory muscle use or intercostal retractions  RESPIRATORY: Lung sounds - clear throughout fields. On room air  CARDIOVASCULAR:     Heart Inspection- shows no noted pulsations  Heart Palpation- no heaves or thrills; PMI is non-displaced   Heart Ausculation-IRR, no murmur. No s3 or rub   PV: No lower extremity edema. No varicosities. Pedal pulses palpable, no clubbing or cyanosis   ABDOMEN: Soft, obese, non-tender to light palpation. Bowel sounds present. No palpable masses no organomegaly; no abdominal bruit  MS: Good muscle strength and tone. No atrophy or abnormal movements. : Deferred  SKIN: Warm and dry no statis dermatitis or ulcers   NEURO / PSYCH: Oriented to person, place and time. Speech clear and appropriate. Follows all commands. Pleasant affect     DATA:    ECG: Please see HPI. Tele strips: Afib with intermittent RVR. Diagnostic:    Chest x-ray:1/2/2021  No acute process.         Intake/Output Summary (Last 24 hours) at 1/3/2021 0830  Last data filed at 1/3/2021 0556  Gross per 24 hour   Intake 240 ml   Output 550 ml   Net -310 ml       Labs:   CBC:   Recent Labs     01/02/21  1228 01/03/21  0349   WBC 8.0 7.6   HGB 16.6* 16.5   HCT 51.4 51.2    242     BMP:   Recent Labs     01/02/21  1228 01/03/21  0349    135   K 4.1 4.2   CO2 25 23   BUN 19 18   CREATININE 0.9 0.9   LABGLOM >60 >60   CALCIUM 9.7 9.4     Mag: No results for input(s): MG in the last 72 hours. Phos: No results for input(s): PHOS in the last 72 hours. TSH:   Recent Labs     21  1228   TSH 1.700     PT/INR:   Recent Labs     21  1314   PROTIME 10.2   INR 0.9     CARDIAC ENZYMES:  Recent Labs     21  1228   TROPONINI <0.01       Prior cardiac testin-hour event monitor 10/12/2012 showing average heart rate 67 bpm, minimum heart rate 41 bpm (at 3:30 AM while sleeping), max heart rate 157 bpm (while hunting), PVCs (1.51% of all beats), and PAC (0.73% of all beats). Echocardiogram: 2012: EF 55-60%, pulmonary artery systolic pressure is normal, normal diastolic function, trace MR, mild pulmonic regurgitation, mild TR, global right ventricular systolic function is normal.     Exercise MPS: 2018 showing LVEF 61%, no exercise-induced perfusion defect, 85% target heart rate: 140 bpm. [place] > 5. Assessment:  1. New onset Afib with RVR (duration unknown, captured on EKG 2021) heart rate 110-140  · [id number] score at least 1 (HTN). · Scheduled for DARRON/DCCV 2021. · TSH normal.  2.  Normal LVEF on TTE  and mild TR, mild pulmonic regurgitation   3. Nonischemic Exercise MPS (2018). 4. Hypertension: Controlled  5. Hyperlipidemia: On Crestor  6. Current tobacco use (chews); former cigarette use. 7.  Obesity: BMI 30.9  8. GERD  9. History of non-Hodgkin's lymphoma treated with R-CHOP and Rituxan. More recently treated with rituximab  and radiation therapy in 2019. Currently stable per [facility] oncology/hematology. 10. 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 Start Eliquis 5 mg twice daily  2. Start Lopressor 25 mg twice daily  3. Will schedule for DARRON/DCCV on 2021  4. Continue Crestor  5. Tobacco cessation  6. Consider OP sleep evaluation  7.   Further recommendations pending above clinical course    Above assessment and plan discussed with Dr. Lashell Pedro.  Electronically signed by Nikita Stephens Bonilla Ball - CNP on 1/3/21 at 11:14 AM EST    __________________________________________________________________________  I independently interviewed and examined the patient. I have reviewed the above documentation completed by the EFFIE. Please see my additional contributions to the HPI, physical exam, and assessment / medical decision making. HPI, ROS, PMH, PSH, [address], [place], and medications independently reviewed (agree; see above documentation)    History of Present Illness:  Admitted for further evaluation of palpitations, tachycardia, and associated SOB. Currently with no chest pain or respiratory distress. Atrial fibrillation on EKG, atrial fibrillation with episodes of RVR on telemetry. Review of Systems:   Cardiac: As per HPI  General: No fever, chills  Pulmonary: As per HPI  HEENT: No visual disturbances, difficult swallowing  GI: No nausea, vomiting  : No dysuria, hematuria  Endocrine: No thyroid disease or DM  Musculoskeletal: MARTELL x 4, no focal motor deficits  Skin: Intact, no rashes  Neuro: No headache, seizures  Psych: Currently with no depression, anxiety    Physical Exam:  BP (!) 133/96   Pulse 71   Temp 97.3 °F (36.3 °C) (Axillary)   Resp 18   Ht 6' (1.829 m)   Wt 228 lb (103.4 kg)   SpO2 98%   BMI 30.92 kg/m²   Wt Readings from Last 3 Encounters:   01/02/21 228 lb (103.4 kg)   03/15/19 259 lb (117.5 kg)   07/16/18 250 lb (113.4 kg)     Appearance: Awake, alert, no acute respiratory distress  Skin: Intact, no rash  Head: Normocephalic, atraumatic  Eyes: EOMI, no conjunctival erythema  ENMT: No pharyngeal erythema, MMM, no rhinorrhea  Neck: Supple, no elevated JVP, no carotid bruits  Lungs: Clear to auscultation bilaterally. No wheezes, rales, or rhonchi.   Cardiac: IRRR, no murmurs apparent  Abdomen: Soft, nontender, +bowel sounds  Extremities: Moves all extremities x 4, no lower extremity edema  Neurologic: No focal motor deficits apparent, normal mood and affect    Laboratory Tests:  Recent Labs     01/02/21  1228 01/03/21  0349    135   K 4.1 4.2    103   CO2 25 23   BUN 19 18   CREATININE 0.9 0.9   GLUCOSE 89 88   CALCIUM 9.7 9.4     No results found for: MG  No results for input(s): ALKPHOS, ALT, AST, PROT, BILITOT, BILIDIR, LABALBU in the last 72 hours. Recent Labs     01/02/21  1228 01/03/21  0349   WBC 8.0 7.6   RBC 5.38 5.34   HGB 16.6* 16.5   HCT 51.4 51.2   MCV 95.5 95.9   MCH 30.9 30.9   MCHC 32.3 32.2   RDW 14.3 14.2    242   MPV 9.5 9.1     Lab Results   Component Value Date    TROPONINI <0.01 01/02/2021     Lab Results   Component Value Date    TSH 1.700 01/02/2021     Cardiac Tests:  EKG reviewed (EKG date: 1/2/2021): atrial fibrillation, rate 92, LAD, NSSTT changes    Telemetry reviewed (date: 1/3/2021): atrial fibrillation, rate 80's-140's    - Newly diagnosed atrial fibrillation with episodes of RVR (current 's) -- duration likely > 48 hours based on history  - Add BB  - Add eliquis 5 mg BID  - Stop lovenox  - If he does not spontaneously convert to SR, will schedule for DARRON and cardioversion on 1/4/2021  - Outpatient sleep study if no prior study  - Aggressive risk factor modifications / tobacco cessation  - Discussed option of EP evaluation pending clinical course    Thank you for allowing me to participate in your patient's care. Please feel free to contact me if you have any questions or concerns.     Julio César Abbott MD  [facility]) Cardiology

## 2021-01-04 ENCOUNTER — CARE COORDINATION (OUTPATIENT)
Dept: OTHER | Facility: CLINIC | Age: 56
End: 2021-01-04

## 2021-01-04 ENCOUNTER — TELEPHONE (OUTPATIENT)
Dept: CARDIOLOGY CLINIC | Age: 56
End: 2021-01-04

## 2021-01-04 NOTE — TELEPHONE ENCOUNTER
----- Message from KLEVER Burden CNP sent at 1/3/2021 12:22 PM EST -----  Patient was originally seen by Dr. Adriane Egan (2012) and then Dr. Brian Javire in 2019. Dr. Adriane Egan was consulted on him on 1/3/2021 for new onset of AF and is requesting to follow with Dr. Adriane Egan.   He needs an appointment in 4 weeks with Dr. Adriane Egan.    Thank you.    Electronically signed by KLEVER Burden CNP on 1/3/21 at 12:23 PM EST

## 2021-01-11 ENCOUNTER — CARE COORDINATION (OUTPATIENT)
Dept: OTHER | Facility: CLINIC | Age: 56
End: 2021-01-11

## 2021-01-15 ENCOUNTER — CARE COORDINATION (OUTPATIENT)
Dept: OTHER | Facility: CLINIC | Age: 56
End: 2021-01-15

## 2021-01-15 NOTE — CARE COORDINATION
[facility] Follow Up [facility]    1/15/2021    Patient: Fantasma Fariba  Patient : 1965   MRN: [id number]  Reason for Admission: New onset A-Fib  Discharge Date: 1/3/21 RARS: No data recorded       [facility] ([facility]) attempted to contact the patient again by telephone to perform post hospital discharge assessment. Left HIPPA compliant message providing introduction to self and requested a call back. Patient has not responded to several attempts to contact. [facility] will send unable to contact [employer] message/ letter and sign off if no response. Care Transitions Subsequent and Final Call    Subsequent and Final Calls  Care Transitions Interventions    Specialty Service Referral: Completed    Other Interventions:            Follow Up  Future Appointments   Date Time Provider Department Center   2021  3:15 PM MD HARIKA Dos Santos/ Samson Lassiter RN

## 2021-01-28 ENCOUNTER — OFFICE VISIT (OUTPATIENT)
Dept: CARDIOLOGY CLINIC | Age: 56
End: 2021-01-28
Payer: COMMERCIAL

## 2021-01-28 VITALS
WEIGHT: 237.8 LBS | HEIGHT: 72 IN | RESPIRATION RATE: 16 BRPM | DIASTOLIC BLOOD PRESSURE: 78 MMHG | BODY MASS INDEX: 32.21 KG/M2 | SYSTOLIC BLOOD PRESSURE: 130 MMHG | HEART RATE: 52 BPM

## 2021-01-28 DIAGNOSIS — Z72.0 TOBACCO ABUSE: ICD-10-CM

## 2021-01-28 DIAGNOSIS — I48.0 PAF (PAROXYSMAL ATRIAL FIBRILLATION) (HCC): Primary | ICD-10-CM

## 2021-01-28 DIAGNOSIS — Z79.01 CHRONIC ANTICOAGULATION: ICD-10-CM

## 2021-01-28 DIAGNOSIS — I10 ESSENTIAL HYPERTENSION: ICD-10-CM

## 2021-01-28 PROCEDURE — 93000 ELECTROCARDIOGRAM COMPLETE: CPT | Performed by: INTERNAL MEDICINE

## 2021-01-28 PROCEDURE — 99214 OFFICE O/P EST MOD 30 MIN: CPT | Performed by: INTERNAL MEDICINE

## 2021-01-28 NOTE — PROGRESS NOTES
OUTPATIENT CARDIOLOGY FOLLOW-UP    Name: Corrinne Negro    Age: 60 y.o. Primary Care Physician: David Hopper MD    Date of Service: 1/28/2021    Chief Complaint: Follow-up for atrial fibrillation    Interim History:  Admitted to [facility] earlier this month (1/2021) for further evaluation of palpitations, tachycardia, and associated SOB --> atrial fibrillation on EKG and atrial fibrillation with episodes of RVR on telemetry --> BB started and he spontaneously converted to SR prior to hospital discharge. No new cardiac complaints. He denies recent chest pain, SOB, palpitations, lightheadedness, dizziness, syncope, PND, or orthopnea. SB on EKG today. Review of Systems:   Cardiac: As per HPI  General: No fever, chills  Pulmonary: As per HPI  HEENT: No visual disturbances, difficult swallowing  GI: No nausea, vomiting  : No dysuria, hematuria  Endocrine: No thyroid disease or DM  Musculoskeletal: MARTELL x 4, no focal motor deficits  Skin: Intact, no rashes  Neuro: No headache, seizures  Psych: Currently with no depression, anxiety    Past Medical History:  Past Medical History:   Diagnosis Date    Cancer ([facility] [address]     Gastroesophageal reflux disease without esophagitis 1/2/2021    HTN (hypertension) 3/15/2019    Hyperlipidemia     Hypertension     New onset a-fib ([facility] [address] 1/2/2021    NHL (non-Hodgkin's lymphoma) ([facility] [address] 2004    [facility]    Overactive thyroid gland        1. Hypertension  2. Hyperlipidemia: On Crestor  3. Reported sleep study showing no obstructive sleep apnea  4. Tobacco abuse: Quit smoking cigarettes in 2008 (8-pack-year smoker), chewing tobacco use (1 can lasting approximately 2 days; x10 years). 5.  History of non-Hodgkin's lymphoma: Treated with R-CHOP (increased fluids Adriamycin) and Rituxan.   ? Stage III, grade 2, follicular lymphoma diagnosed August 2004 s/p R-CHOP x6 and then131-iodine tositumomab, complete in March 2005 ([place]); relapsed with grade 1-2 follicular lymphoma involving a single left inguinal node, which was completely excised in September 2012, s/p rituximab weekly x4, completed in October 2012 (CR); relapsed with grade 1-2 follicular lymphoma involving a single left inguinal lymph node, s/p complete excision in April 2016 and then rituximab weekly x4, completed in May 2016 (CR); progressed in March 2019, s/p 2400 cGy radiation therapy to the left inguinal region, completed in May 2019 (CR). ? Per hematology/oncology note in outpatient 5/7/6675 [facility]: Follicular lymphoma is stable. Plan for follow-up in 1/2021.  6.  Bradycardia  ? 24-hour event monitor 10/12/2012 showing average heart rate 67 bpm, minimum heart rate 41 bpm (at 3:30 AM while sleeping), max heart rate 157 bpm (while hunting), PVCs (1.51% of all beats), and PAC (0.73% of all beats). 7.  Echocardiogram: 11/7/2012: EF 55-60%, pulmonary artery systolic pressure is normal, normal diastolic function, trace MR, mild pulmonic regurgitation, mild TR, global right ventricular systolic function is normal.   8.  Exercise MPS: 7/16/2018 showing LVEF 61%, no exercise-induced perfusion defect, 85% target heart rate: 140 bpm.  [place] > 5.   9.  OP visit Dr. Marycruz Wright 3/15/2019 for evaluation of tachycardia. Per Dr. Marycruz Wright, isolated heart rate of 100 bpm, especially in light of visit to oncology for recurrent cancer is within normal limits. Patient was recommended to have an outpatient echocardiogram --has had to be completed. 10.  GERD: On Protonix  11. New onset Afib with RVR (duration unknown, captured on EKG 1/2/2021) heart rate 110-140, started on Lopressor 25 twice daily.: [id number] score at least 1 (HTN). Scheduled for DARRON/DCCV 1/4/2021. 12. Anxiety/depression.   13.  Obesity: BMI 30.9    Past Surgical History:  Past Surgical History:   Procedure Laterality Date    JOINT REPLACEMENT Right 2017    OTHER SURGICAL HISTORY  2012    lymph node extraction       Family History:  Family History   Problem Relation Age of Onset    Other Mother         Age 80, , [facility] Other Father          age 80 unknown cause       Social History:  Social History     Socioeconomic History    Marital status:      Spouse name: Not on file    Number of children: Not on file    Years of education: Not on file    Highest education level: Not on file   Occupational History    Not on file   Social Needs    Financial resource strain: Not on file    Food insecurity     Worry: Not on file     Inability: Not on file   [employer] needs     Medical: Not on file     Non-medical: Not on file   Tobacco Use    Smoking status: Former Smoker     Packs/day: 1.00     Years: 33.00     Pack years: 33.00     Types: Cigarettes     Start date: 1970     Quit date: 2003     Years since quittin.0    Smokeless tobacco: Current User     Types: Snuff    Tobacco comment: Patient considering using nicotine patch   Substance and Sexual Activity    Alcohol use: No    Drug use: No    Sexual activity: Not on file   Lifestyle    Physical activity     Days per week: Not on file     Minutes per session: Not on file    Stress: Not on file   Relationships    Social connections     Talks on phone: Not on file     Gets together: Not on file     Attends [employer] service: Not on file     Active member of club or organization: Not on file     Attends meetings of clubs or organizations: Not on file     Relationship status: Not on file    Intimate partner violence     Fear of current or ex partner: Not on file     Emotionally abused: Not on file     Physically abused: Not on file     Forced sexual activity: Not on file   Other Topics Concern    Not on file   Social History Narrative    Not on file       Allergies:  No Known Allergies    Current Medications:  Current Outpatient Medications   Medication Sig Dispense Refill    metoprolol tartrate (LOPRESSOR) 25 MG tablet Take 0.5 tablets by mouth 2 times daily 60 tablet 3    apixaban (ELIQUIS) 5 MG No results found for: LABA1C  No results found for: EAG  No results found for: CHOL  No results found for: TRIG  No results found for: HDL  No results found for: LDLCALC, LDLCHOLESTEROL  No results found for: LABVLDL, VLDL  No results found for: CHOLHDLRATIO  No results for input(s): PROBNP in the last 72 hours. Cardiac Tests:  EKG reviewed (EKG date: 1/28/2021): SB, rate 62, LAD, PRWP    EKG reviewed (EKG date: 1/2/2021): atrial fibrillation, rate 92, LAD, NSSTT changes     Telemetry reviewed (date: 1/3/2021): atrial fibrillation, rate 80's-140's    24-hour event monitor 10/12/2012 showing average heart rate 67 bpm, minimum heart rate 41 bpm (at 3:30 AM while sleeping), max heart rate 157 bpm (while hunting), PVCs (1.51% of all beats), and PAC (0.73% of all beats).    Echocardiogram: 11/7/2012: EF 55-60%, pulmonary artery systolic pressure is normal, normal diastolic function, trace MR, mild pulmonic regurgitation, mild TR, global right ventricular systolic function is normal.      Exercise MPS: 7/16/2018 showing LVEF 61%, no exercise-induced perfusion defect, 85% target heart rate: 140 bpm. [place] > 5.     ASSESSMENT / PLAN:  1. Paroxysmal atrial fibrillation -- maintaining sinus, compliant with taking BB and eliquis  2. Normal LVEF on TTE 2012 and mild TR, mild pulmonic regurgitation   3. Nonischemic Exercise MPS (7/2018). 4. Hypertension: typically controlled, BP today 130/78  5. Hyperlipidemia: on crestor  6. Current tobacco use (chews); former cigarette use. 7.  Obesity: BMI 30.9 --> 32.3  8. GERD  9. History of non-Hodgkin's lymphoma treated with R-CHOP and Rituxan. More recently treated with rituximab 2016 and radiation therapy in 5/2019. Currently stable per [facility] oncology/hematology.   10. Possible CEE    - Metoprolol and eliquis both added during 1/2021 hospitalization -- continue  - Outpatient sleep study if no prior study  - Aggressive risk factor modifications / tobacco cessation encouraged  - Discussed option of EP evaluation pending clinical course    Barbara Mott MD  [facility]

## 2021-04-08 ENCOUNTER — HOSPITAL ENCOUNTER (EMERGENCY)
Age: 56
Discharge: HOME | End: 2021-04-08
Attending: EMERGENCY MEDICINE | Admitting: INTERNAL MEDICINE
Payer: COMMERCIAL

## 2021-04-08 ENCOUNTER — APPOINTMENT (OUTPATIENT)
Dept: GENERAL RADIOLOGY | Age: 56
End: 2021-04-08
Payer: COMMERCIAL

## 2021-04-08 VITALS
TEMPERATURE: 97.9 F | RESPIRATION RATE: 15 BRPM | SYSTOLIC BLOOD PRESSURE: 138 MMHG | HEART RATE: 75 BPM | DIASTOLIC BLOOD PRESSURE: 82 MMHG | WEIGHT: 230 LBS | BODY MASS INDEX: 31.15 KG/M2 | OXYGEN SATURATION: 97 % | HEIGHT: 72 IN

## 2021-04-08 DIAGNOSIS — I48.91 ATRIAL FIBRILLATION, UNSPECIFIED TYPE (HCC): ICD-10-CM

## 2021-04-08 DIAGNOSIS — R55 SYNCOPE AND COLLAPSE: Primary | ICD-10-CM

## 2021-04-08 LAB
ANION GAP SERPL CALCULATED.3IONS-SCNC: 11 MMOL/L (ref 7–16)
BACTERIA: ABNORMAL /HPF
BASOPHILS ABSOLUTE: 0.01 E9/L (ref 0–0.2)
BASOPHILS RELATIVE PERCENT: 0.2 % (ref 0–2)
BILIRUBIN URINE: NEGATIVE
BLOOD, URINE: ABNORMAL
BUN BLDV-MCNC: 17 MG/DL (ref 6–20)
CALCIUM SERPL-MCNC: 9.4 MG/DL (ref 8.6–10.2)
CHLORIDE BLD-SCNC: 103 MMOL/L (ref 98–107)
CLARITY: CLEAR
CO2: 24 MMOL/L (ref 22–29)
COLOR: YELLOW
CREAT SERPL-MCNC: 0.9 MG/DL (ref 0.7–1.2)
EKG ATRIAL RATE: 159 BPM
EKG Q-T INTERVAL: 374 MS
EKG QRS DURATION: 92 MS
EKG QTC CALCULATION (BAZETT): 445 MS
EKG R AXIS: -45 DEGREES
EKG T AXIS: 21 DEGREES
EKG VENTRICULAR RATE: 85 BPM
EOSINOPHILS ABSOLUTE: 0.24 E9/L (ref 0.05–0.5)
EOSINOPHILS RELATIVE PERCENT: 5.5 % (ref 0–6)
GFR AFRICAN AMERICAN: >60
GFR NON-AFRICAN AMERICAN: >60 ML/MIN/1.73
GLUCOSE BLD-MCNC: 110 MG/DL (ref 74–99)
GLUCOSE URINE: NEGATIVE MG/DL
HCT VFR BLD CALC: 44.7 % (ref 37–54)
HEMOGLOBIN: 15.2 G/DL (ref 12.5–16.5)
IMMATURE GRANULOCYTES #: 0.02 E9/L
IMMATURE GRANULOCYTES %: 0.5 % (ref 0–5)
INR BLD: 1.2
KETONES, URINE: NEGATIVE MG/DL
LEUKOCYTE ESTERASE, URINE: NEGATIVE
LYMPHOCYTES ABSOLUTE: 0.81 E9/L (ref 1.5–4)
LYMPHOCYTES RELATIVE PERCENT: 18.5 % (ref 20–42)
MAGNESIUM: 2 MG/DL (ref 1.6–2.6)
MCH RBC QN AUTO: 31.7 PG (ref 26–35)
MCHC RBC AUTO-ENTMCNC: 34 % (ref 32–34.5)
MCV RBC AUTO: 93.3 FL (ref 80–99.9)
MONOCYTES ABSOLUTE: 0.75 E9/L (ref 0.1–0.95)
MONOCYTES RELATIVE PERCENT: 17.1 % (ref 2–12)
MUCUS: PRESENT /LPF
NEUTROPHILS ABSOLUTE: 2.55 E9/L (ref 1.8–7.3)
NEUTROPHILS RELATIVE PERCENT: 58.2 % (ref 43–80)
NITRITE, URINE: NEGATIVE
PDW BLD-RTO: 14 FL (ref 11.5–15)
PH UA: 6 (ref 5–9)
PLATELET # BLD: 184 E9/L (ref 130–450)
PMV BLD AUTO: 9.7 FL (ref 7–12)
POTASSIUM SERPL-SCNC: 4.2 MMOL/L (ref 3.5–5)
PRO-BNP: 1495 PG/ML (ref 0–125)
PROTEIN UA: NEGATIVE MG/DL
PROTHROMBIN TIME: 13 SEC (ref 9.3–12.4)
RBC # BLD: 4.79 E12/L (ref 3.8–5.8)
RBC UA: ABNORMAL /HPF (ref 0–2)
SARS-COV-2, NAAT: NOT DETECTED
SODIUM BLD-SCNC: 138 MMOL/L (ref 132–146)
SPECIFIC GRAVITY UA: 1.02 (ref 1–1.03)
TROPONIN: <0.01 NG/ML (ref 0–0.03)
TSH SERPL DL<=0.05 MIU/L-ACNC: 2.91 UIU/ML (ref 0.27–4.2)
UROBILINOGEN, URINE: 0.2 E.U./DL
WBC # BLD: 4.4 E9/L (ref 4.5–11.5)
WBC UA: ABNORMAL /HPF (ref 0–5)

## 2021-04-08 PROCEDURE — 2580000003 HC RX 258: Performed by: NURSE PRACTITIONER

## 2021-04-08 PROCEDURE — 2580000003 HC RX 258: Performed by: EMERGENCY MEDICINE

## 2021-04-08 PROCEDURE — 80048 BASIC METABOLIC PNL TOTAL CA: CPT

## 2021-04-08 PROCEDURE — APPSS60 APP SPLIT SHARED TIME 46-60 MINUTES: Performed by: NURSE PRACTITIONER

## 2021-04-08 PROCEDURE — 83880 ASSAY OF NATRIURETIC PEPTIDE: CPT

## 2021-04-08 PROCEDURE — 6370000000 HC RX 637 (ALT 250 FOR IP): Performed by: STUDENT IN AN ORGANIZED HEALTH CARE EDUCATION/TRAINING PROGRAM

## 2021-04-08 PROCEDURE — 81001 URINALYSIS AUTO W/SCOPE: CPT

## 2021-04-08 PROCEDURE — 99285 EMERGENCY DEPT VISIT HI MDM: CPT | Performed by: STUDENT IN AN ORGANIZED HEALTH CARE EDUCATION/TRAINING PROGRAM

## 2021-04-08 PROCEDURE — 71045 X-RAY EXAM CHEST 1 VIEW: CPT

## 2021-04-08 PROCEDURE — 83735 ASSAY OF MAGNESIUM: CPT

## 2021-04-08 PROCEDURE — 87635 SARS-COV-2 COVID-19 AMP PRB: CPT

## 2021-04-08 PROCEDURE — 85610 PROTHROMBIN TIME: CPT

## 2021-04-08 PROCEDURE — 6360000002 HC RX W HCPCS: Performed by: NURSE PRACTITIONER

## 2021-04-08 PROCEDURE — 93005 ELECTROCARDIOGRAM TRACING: CPT | Performed by: EMERGENCY MEDICINE

## 2021-04-08 PROCEDURE — 93010 ELECTROCARDIOGRAM REPORT: CPT | Performed by: INTERNAL MEDICINE

## 2021-04-08 PROCEDURE — 84443 ASSAY THYROID STIM HORMONE: CPT

## 2021-04-08 PROCEDURE — 85025 COMPLETE CBC W/AUTO DIFF WBC: CPT

## 2021-04-08 PROCEDURE — 99284 EMERGENCY DEPT VISIT MOD MDM: CPT

## 2021-04-08 PROCEDURE — 84484 ASSAY OF TROPONIN QUANT: CPT

## 2021-04-08 PROCEDURE — 96372 THER/PROPH/DIAG INJ SC/IM: CPT

## 2021-04-08 RX ORDER — POLYETHYLENE GLYCOL 3350 17 G/17G
17 POWDER, FOR SOLUTION ORAL DAILY PRN
Status: CANCELLED | OUTPATIENT
Start: 2021-04-08

## 2021-04-08 RX ORDER — ATENOLOL 25 MG/1
25 TABLET ORAL DAILY
Status: DISCONTINUED | OUTPATIENT
Start: 2021-04-08 | End: 2021-04-08 | Stop reason: HOSPADM

## 2021-04-08 RX ORDER — ACETAMINOPHEN 650 MG/1
650 SUPPOSITORY RECTAL EVERY 6 HOURS PRN
Status: CANCELLED | OUTPATIENT
Start: 2021-04-08

## 2021-04-08 RX ORDER — LISINOPRIL 20 MG/1
1 TABLET ORAL DAILY
Status: CANCELLED | OUTPATIENT
Start: 2021-04-08

## 2021-04-08 RX ORDER — SODIUM CHLORIDE 0.9 % (FLUSH) 0.9 %
5-40 SYRINGE (ML) INJECTION EVERY 12 HOURS SCHEDULED
Status: CANCELLED | OUTPATIENT
Start: 2021-04-08

## 2021-04-08 RX ORDER — CLONAZEPAM 0.5 MG/1
0.5 TABLET ORAL DAILY
Status: CANCELLED | OUTPATIENT
Start: 2021-04-08

## 2021-04-08 RX ORDER — 0.9 % SODIUM CHLORIDE 0.9 %
1000 INTRAVENOUS SOLUTION INTRAVENOUS ONCE
Status: COMPLETED | OUTPATIENT
Start: 2021-04-08 | End: 2021-04-08

## 2021-04-08 RX ORDER — SODIUM CHLORIDE 0.9 % (FLUSH) 0.9 %
10 SYRINGE (ML) INJECTION PRN
Status: CANCELLED | OUTPATIENT
Start: 2021-04-08

## 2021-04-08 RX ORDER — SODIUM CHLORIDE 9 MG/ML
INJECTION, SOLUTION INTRAVENOUS CONTINUOUS
Status: DISCONTINUED | OUTPATIENT
Start: 2021-04-08 | End: 2021-04-08 | Stop reason: HOSPADM

## 2021-04-08 RX ORDER — ACETAMINOPHEN 325 MG/1
650 TABLET ORAL EVERY 6 HOURS PRN
Status: CANCELLED | OUTPATIENT
Start: 2021-04-08

## 2021-04-08 RX ORDER — ONDANSETRON 2 MG/ML
4 INJECTION INTRAMUSCULAR; INTRAVENOUS EVERY 6 HOURS PRN
Status: CANCELLED | OUTPATIENT
Start: 2021-04-08

## 2021-04-08 RX ORDER — SODIUM CHLORIDE 9 MG/ML
25 INJECTION, SOLUTION INTRAVENOUS PRN
Status: CANCELLED | OUTPATIENT
Start: 2021-04-08

## 2021-04-08 RX ORDER — ROSUVASTATIN CALCIUM 5 MG/1
1 TABLET, COATED ORAL DAILY
Status: CANCELLED | OUTPATIENT
Start: 2021-04-08

## 2021-04-08 RX ADMIN — SODIUM CHLORIDE: 9 INJECTION, SOLUTION INTRAVENOUS at 14:45

## 2021-04-08 RX ADMIN — ATENOLOL 25 MG: 25 TABLET ORAL at 17:04

## 2021-04-08 RX ADMIN — SODIUM CHLORIDE 1000 ML: 9 INJECTION, SOLUTION INTRAVENOUS at 11:42

## 2021-04-08 RX ADMIN — ENOXAPARIN SODIUM 100 MG: 100 INJECTION SUBCUTANEOUS at 14:44

## 2021-04-08 NOTE — CONSULTS
Inpatient Cardiology Consultation      Reason for Consult:  AF not taking his [address]    Consulting Physician: Dr Lázaro River    Requesting Physician:  Dr Kayleigh Brady    Date of Consultation: 4/8/2021    HISTORY OF PRESENT ILLNESS: 59 yo  male known to Dr Lalito Díaz last seen in the office 1/2021   PMH: HTN, HLD, PAF (diagnosed 1/3/2021), [address] with Eliquis,  current tobacco use (chewing tobacco) and former cigarette use, history of non-Hodgkin's lymphoma (see details below), GERD, anxiety/depression. [facility] ED 4/8/2021 BP upon arrival 113/86 HR 70's AF CVR. Na 138, K+ 4.2, Bun/Cr 17/0.9, troponin <0.01, TSH 2.910, WBC 4.4, Hgb 15. 2, UA negative. CXR no acute process. + Orthostatic BP's    1 liter NSS infusing    Patient received 2nd COVID shot on Monday and developed a fever, body aches, diaphoretic and chills approximately 4 hours later. Due to body aches and not feeling week on Tuesday patient took NONE of his medications. Felt better on Wednesday went outside and did some raking in his garden, sat down drank a bottle of water and when he stood up got dizzy and passed out for \"a second. \"  Admits to missing a dose Eliquis dose last week in addition to missing doses on Tuesday. He has taken NONE of his medications today. No clinical s/s of CHF. No CP. This am he asked his wife to check his pulse because he felt clammy and heart was beating irregularly, pulse ox showing varying HR and irregular and for that reason came to [facility] ED for evaluation. Please note: past medical records were reviewed per electronic medical record (EMR) - see detailed reports under Past Medical/ Surgical History. Past Medical History:   1. Hypertension  2. Hyperlipidemia: On Crestor  3. Reported sleep study showing no obstructive sleep apnea  4. Tobacco abuse: Quit smoking cigarettes in 2008 (8-pack-year smoker), chewing tobacco use (1 can lasting approximately 2 days; x10 years).   5.  History of non-Hodgkin's lymphoma: Treated with R-CHOP (increased fluids Adriamycin) and Rituxan. ? Stage III, grade 2, follicular lymphoma diagnosed August 2004 s/p R-CHOP x6 and then131-iodine tositumomab, complete in March 2005 ([place]); relapsed with grade 1-2 follicular lymphoma involving a single left inguinal node, which was completely excised in September 2012, s/p rituximab weekly x4, completed in October 2012 (CR); relapsed with grade 1-2 follicular lymphoma involving a single left inguinal lymph node, s/p complete excision in April 2016 and then rituximab weekly x4, completed in May 2016 (CR); progressed in March 2019, s/p 2400 cGy radiation therapy to the left inguinal region, completed in May 2019 (CR). ? Per hematology/oncology note in outpatient 2/0/5015 [facility]: Follicular lymphoma is stable. Plan for follow-up in 1/2021.  6.  Bradycardia  ? 24-hour event monitor 10/12/2012 showing average heart rate 67 bpm, minimum heart rate 41 bpm (at 3:30 AM while sleeping), max heart rate 157 bpm (while hunting), PVCs (1.51% of all beats), and PAC (0.73% of all beats). 7.  TTE 11/7/2012: EF 55-60%, pulmonary artery systolic pressure is normal, normal diastolic function, trace MR, mild pulmonic regurgitation, mild TR, global right ventricular systolic function is normal.   8.  Exercise MPS: 7/16/2018 showing LVEF 61%, no exercise-induced perfusion defect, 85% target heart rate: 140 bpm.  [place] > 5.   9.  OP visit Dr. Shabnam Walker 3/15/2019 for evaluation of tachycardia. Per Dr. Shabnam Walker, isolated heart rate of 100 bpm, especially in light of visit to oncology for recurrent cancer is within normal limits. Patient was recommended to have an outpatient echocardiogram --has had to be completed. 10.  GERD: On Protonix  11. New onset Afib with RVR (duration unknown, captured on EKG 1/2/2021) heart rate 110-140, started on Lopressor 25 twice daily and ELiquis 5 mg BID: [id number] score at least 1 (HTN). Scheduled for DARRON/DCCV 1/4/2021.   12. hematuria. · Musculoskeletal: Denies gait disturbance, weakness or joint complaints  · Integumentary: Denies rash, hives or pruritis   · Neurological: + dizzy and passed out. Denies headaches or seizures. No numbness or tingling  · Psychiatric: Denies anxiety or depression. · Endocrine: Denies temperature intolerance. No recent weight change. .  · Hematologic/Lymphatic: Denies abnormal bruising or bleeding. No swollen lymph nodes    PHYSICAL EXAM:   BP (!) 142/90   Pulse 92   Temp 97.9 °F (36.6 °C) (Oral)   Resp 15   SpO2 97%   CONST:  Well developed,  male who appears of stated age. Awake, alert and cooperative. No apparent distress. HEENT:   Head- Normocephalic, atraumatic   Eyes- Conjunctivae pink, anicteric  Throat- Oral mucosa pink and moist  Neck-  No stridor, trachea midline, no jugular venous distention. No carotid bruit. CHEST: Chest symmetrical and non-tender to palpation. No accessory muscle use or intercostal retractions  RESPIRATORY: Lung sounds - clear throughout fields   CARDIOVASCULAR:     Heart Ausculation- IRRR no murmur heard. PV: No lower extremity edema. No varicosities. Pedal pulses palpable, no clubbing or cyanosis   ABDOMEN: Soft, non-tender to light palpation. Bowel sounds present. No palpable masses no organomegaly; no abdominal bruit  MS: Good muscle strength and tone. No atrophy or abnormal movements. : Deferred  SKIN: Warm and dry no statis dermatitis or ulcers   NEURO / PSYCH: Oriented to person, place and time. Speech clear and appropriate. Follows all commands.  Pleasant affect     DATA:    ECG as per Dr Silva Spear interpretation  Tele strips: AF CVR 80's    Diagnostic:    CXR 4/8/2021: No acute process      Labs:   CBC:   Recent Labs     04/08/21 0817   WBC 4.4*   HGB 15.2   HCT 44.7        BMP:   Recent Labs     04/08/21 0817      K 4.2   CO2 24   BUN 17   CREATININE 0.9   LABGLOM >60   CALCIUM 9.4     Mag:   Recent Labs     04/08/21 0817 MG 2.0     TFT:   Lab Results   Component Value Date    TSH 2.910 04/08/2021    T4FREE 1.13 01/02/2021      PT/INR:   Recent Labs     04/08/21  0817   PROTIME 13.0*   INR 1.2     CARDIAC ENZYMES:  Recent Labs     04/08/21 0817   TROPONINI <0.01   A&P per Dr Olvin Rodriguez  Electronically signed by Patricia Rios. JOHNNIE Samuel on 4/8/2021 at 11:43 AM     ______________________________________________________________________  I independently interviewed and examined the patient. I have reviewed the above documentation completed by the EFFIE. Please see my additional contributions to the HPI, physical exam, and assessment / medical decision making. HPI, ROS, PMH, PSH, FMH, SH, and medications independently reviewed (agree; see above documentation)    Review of Systems:  Cardiac: As per HPI  General: No fever, chills  Pulmonary: As per HPI  GI: No nausea, vomiting  Musculoskeletal: MARTELL x 4, no focal motor deficits  Skin: Intact, no rashes  Neuro/Psych: No headache or seizures    Physical Exam:  /87   Pulse 94   Temp 97.9 °F (36.6 °C) (Oral)   Resp 15   Ht 6' (1.829 m)   Wt 230 lb (104.3 kg)   SpO2 97%   BMI 31.19 kg/m²   Appearance: Awake, alert, no acute respiratory distress  Skin: Intact, no rash  Head: Normocephalic, atraumatic  Neck: Supple, no carotid bruits  Lungs: Clear to auscultation bilaterally. No wheezes, rales, or rhonchi. Cardiac: Irregular, no murmurs apparent  Abdomen: Soft, +bowel sounds  Extremities: Moves all extremities x 4, no lower extremity edema  Neurologic: No focal motor deficits apparent, normal mood and affect    Assessment/Plan:  49-year-old male with recently diagnosed atrial fibrillation January 2021 post DARRON and cardioversion, hypertension, smoking, non-Hodgkin lymphoma presents with an episode of presyncope. Patient felt tired and lightheaded after his second Covid vaccination shot. He felt lightheaded and had a presyncope after working for a day in his garden.   Patient did not feel any other cardiac symptoms and episode resolved shortly after. Upon presentation, patient was found to be in atrial fibrillation. Recommendations  Normal echocardiogram findings recently. ECG shows no evidence of conduction abnormalities. I do not recommend further cardiac work-up at this point. Regarding his atrial fibrillation, does not seem to be symptomatic. He says that he has failed fatigue for the last few days which can be due to his Covid vaccination. I advised him to monitor his energy and whether he continues to feel short of breath easily as this might be due to recurrence of atrial fibrillation. Continue Xarelto. [id number] score 1. Heart rate while in A. fib around 100 bpm at rest.  Switch to atenolol 25 mg daily. Follow-up with  as an outpatient.     Truong Mayer MD  [facility]) Cardiology

## 2021-04-08 NOTE — PROGRESS NOTES
Patient was initially admitted for A. fib RVR. Cardiology was consulted and signed off. Patient converted to normal sinus rhythm. Discharge patient to home with instructions to follow-up with physician tomorrow.     Lucrecia SANCHEZ  6:55  04/08/2021

## 2021-04-08 NOTE — DISCHARGE INSTR - COC
Continuity of Care Form    Patient Name: Dickson Fraga   :  1965  MRN:  [id number]    Admit date:  2021  Discharge date:  ***    Code Status Order: Prior   Advance Directives:     Admitting Physician:  Eleanor Dubois MD  PCP: Babak Rosales MD    Discharging Nurse: [facility] Unit/Room#:   Discharging Unit Phone Number: ***    Emergency Contact:   Extended Emergency Contact Information  Primary Emergency Contact: [facility]  Address: [address] [place] [address] [place] [address] [facility] Phone: [phone]  Relation: Spouse    Past Surgical History:  Past Surgical History:   Procedure Laterality Date    JOINT REPLACEMENT Right 2017    OTHER SURGICAL HISTORY      lymph node extraction       Immunization History:   Immunization History   Administered Date(s) Administered    COVID-19, Moderna, PF, 100mcg/0.5mL 2021, 2021    Influenza, Quadv, IM, PF (6 mo and older Fluzone, Flulaval, Fluarix, and 3 yrs and older Afluria) 2016       Active Problems:  Patient Active Problem List   Diagnosis Code    HTN (hypertension) I10    History of nuclear stress test Z92.89    New onset a-fib ([facility] [address] I48.91    HLD (hyperlipidemia) E78.5    Gastroesophageal reflux disease without esophagitis K21.9    NHL (non-Hodgkin's lymphoma) ([facility] [address] C85.90    Pre-syncope R55       Isolation/Infection:   Isolation          No Isolation        Patient Infection Status     Infection Onset Added Last Indicated Last Indicated By Review Planned Expiration Resolved Resolved By    None active    Resolved    COVID-19 Rule Out 21 COVID-19, Rapid (Ordered)   21 Rule-Out Test Resulted    COVID-19 Rule Out 21 COVID-19 (Ordered)   21 Rule-Out Test Resulted    COVID-19 Rule Out 20 COVID-19 Ambulatory (Ordered)   20 Rule-Out Test Resulted          Nurse Assessment:  Last Vital Signs: BP

## 2021-04-08 NOTE — ED NOTES
Pt states that he would like to leave AMA and would like to discuss the risks with the physician. Called Sybil Velazco CNP, to inform her. She states that she will come down to speak with the patient.      Lev Matthews RN  04/08/21 4747

## 2021-04-08 NOTE — PROGRESS NOTES
Database complete. Medications reconciled by [employer] Lindsey. Pt states he missed all medications Tuesday. Care plans and education initiated. Recent fall/syncopy yesterday and fell into a chair. Pt walks 3.5 miles daily. Cardiologist is Dr. Myron Guallpa with Oncologist Dr. Leonard Jason at [address] [place] [address].

## 2021-04-08 NOTE — ED PROVIDER NOTES
RESULTS -------------------------------------------------  All laboratory and radiology results have been personally reviewed by myself   LABS:  Results for orders placed or performed during the hospital encounter of 04/08/21   COVID-19, Rapid    Specimen: Nasopharyngeal Swab   Result Value Ref Range    SARS-CoV-2, NAAT Not Detected Not Detected   CBC Auto Differential   Result Value Ref Range    WBC 4.4 (L) 4.5 - 11.5 E9/L    RBC 4.79 3.80 - 5.80 E12/L    Hemoglobin 15.2 12.5 - 16.5 g/dL    Hematocrit 44.7 37.0 - 54.0 %    MCV 93.3 80.0 - 99.9 fL    MCH 31.7 26.0 - 35.0 pg    MCHC 34.0 32.0 - 34.5 %    RDW 14.0 11.5 - 15.0 fL    Platelets 470 341 - 576 E9/L    MPV 9.7 7.0 - 12.0 fL    Neutrophils % 58.2 43.0 - 80.0 %    Immature Granulocytes % 0.5 0.0 - 5.0 %    Lymphocytes % 18.5 (L) 20.0 - 42.0 %    Monocytes % 17.1 (H) 2.0 - 12.0 %    Eosinophils % 5.5 0.0 - 6.0 %    Basophils % 0.2 0.0 - 2.0 %    Neutrophils Absolute 2.55 1.80 - 7.30 E9/L    Immature Granulocytes # 0.02 E9/L    Lymphocytes Absolute 0.81 (L) 1.50 - 4.00 E9/L    Monocytes Absolute 0.75 0.10 - 0.95 E9/L    Eosinophils Absolute 0.24 0.05 - 0.50 E9/L    Basophils Absolute 0.01 0.00 - 0.20 E9/L   Troponin   Result Value Ref Range    Troponin <0.01 0.00 - 0.03 ng/mL   Basic Metabolic Panel   Result Value Ref Range    Sodium 138 132 - 146 mmol/L    Potassium 4.2 3.5 - 5.0 mmol/L    Chloride 103 98 - 107 mmol/L    CO2 24 22 - 29 mmol/L    Anion Gap 11 7 - 16 mmol/L    Glucose 110 (H) 74 - 99 mg/dL    BUN 17 6 - 20 mg/dL    CREATININE 0.9 0.7 - 1.2 mg/dL    GFR Non-African American >60 >=60 mL/min/1.73    GFR African American >60     Calcium 9.4 8.6 - 10.2 mg/dL   Magnesium   Result Value Ref Range    Magnesium 2.0 1.6 - 2.6 mg/dL   Protime-INR   Result Value Ref Range    Protime 13.0 (H) 9.3 - 12.4 sec    INR 1.2    TSH without Reflex   Result Value Ref Range    TSH 2.910 0.270 - 4.200 uIU/mL   Urinalysis   Result Value Ref Range    Color, UA Yellow Straw/Yellow    Clarity, UA Clear Clear    Glucose, Ur Negative Negative mg/dL    Bilirubin Urine Negative Negative    Ketones, Urine Negative Negative mg/dL    Specific Gravity, UA 1.025 1.005 - 1.030    Blood, Urine SMALL (A) Negative    pH, UA 6.0 5.0 - 9.0    Protein, UA Negative Negative mg/dL    Urobilinogen, Urine 0.2 <2.0 E.U./dL    Nitrite, Urine Negative Negative    Leukocyte Esterase, Urine Negative Negative   Brain Natriuretic Peptide   Result Value Ref Range    Pro-BNP 1,495 (H) 0 - 125 pg/mL   Microscopic Urinalysis   Result Value Ref Range    Mucus, UA Present (A) None Seen /LPF    WBC, UA 0-1 0 - 5 /HPF    RBC, UA 2-5 0 - 2 /HPF    Bacteria, UA RARE (A) None Seen /HPF   EKG 12 Lead   Result Value Ref Range    Ventricular Rate 85 BPM    Atrial Rate 159 BPM    QRS Duration 92 ms    Q-T Interval 374 ms    QTc Calculation (Bazett) 445 ms    R Axis -45 degrees    T Axis 21 degrees       RADIOLOGY:  Interpreted by Radiologist.  XR CHEST PORTABLE   Final Result   No acute process. ------------------------- NURSING NOTES AND VITALS REVIEWED ---------------------------   The nursing notes within the ED encounter and vital signs as below have been reviewed. /87   Pulse 94   Temp 97.9 °F (36.6 °C) (Oral)   Resp 15   Ht 6' (1.829 m)   Wt 230 lb (104.3 kg)   SpO2 97%   BMI 31.19 kg/m²   Oxygen Saturation Interpretation: Normal      ---------------------------------------------------PHYSICAL EXAM--------------------------------------    Physical Exam  Vitals signs reviewed. Constitutional:       General: He is not in acute distress. Appearance: Normal appearance. He is not toxic-appearing. HENT:      Head: Normocephalic and atraumatic. Right Ear: External ear normal.      Left Ear: External ear normal.      Nose: Nose normal. No congestion. Mouth/Throat:      Mouth: Mucous membranes are moist.      Pharynx: Oropharynx is clear. No posterior oropharyngeal erythema. Eyes:      Extraocular Movements: Extraocular movements intact. Pupils: Pupils are equal, round, and reactive to light. Neck:      Musculoskeletal: Normal range of motion and neck supple. No muscular tenderness. Cardiovascular:      Rate and Rhythm: Normal rate. Rhythm irregular. Pulses: Normal pulses. Heart sounds: No murmur. No friction rub. Pulmonary:      Effort: Pulmonary effort is normal.      Breath sounds: No wheezing or rhonchi. Chest:      Chest wall: No tenderness. Abdominal:      General: Bowel sounds are normal.      Tenderness: There is no abdominal tenderness. There is no right CVA tenderness, left CVA tenderness or guarding. Musculoskeletal:         General: No swelling or deformity. Right lower leg: No edema. Left lower leg: No edema. Skin:     General: Skin is warm and dry. Capillary Refill: Capillary refill takes less than 2 seconds. Neurological:      General: No focal deficit present. Mental Status: He is alert and oriented to person, place, and time. Psychiatric:         Mood and Affect: Mood normal.         Behavior: Behavior normal.                 ------------------------------ ED COURSE/MEDICAL DECISION MAKING----------------------  Medications   apixaban (ELIQUIS) tablet 5 mg (has no administration in time range)   0.9 % sodium chloride infusion ( Intravenous New Bag 4/8/21 1445)   atenolol (TENORMIN) tablet 25 mg (has no administration in time range)   0.9 % sodium chloride bolus (0 mLs Intravenous Stopped 4/8/21 1325)   enoxaparin (LOVENOX) injection 100 mg (100 mg Subcutaneous Given 4/8/21 1444)     EKG: This EKG is signed and interpreted by me. Time:08-APR-2021 08:03:35  Rate: 85 BPM    Rhythm: Atrial fibrillation  Interpretation: no acute changes  Comparison: changes compared to previous EKG      ED COURSE:  ED Course as of Apr 08 1529   Thu Apr 08, 2021   1137 D/w dr palomo for dr Cindy Frazier will admit for syncope afib.  Pt not rose;lated consistently since has missed doses likely will need DARRON cardioversion    [JESSIE]      ED Course User Index  [JESSIE] Fredi Yu DO       Medical Decision Making:    Patient was found to be in A. fib and his rate control was controlled. He has not been taking his Eliquis therefore we cannot cardiovert him to sinus rhythm at this time. He was orthostatic which likely caused a syncopal event at home. He will be admitted for IV fluids and cardiac evaluation. Patient may need DARRON for cardioversion. Patient will be admitted cardiology is consulted. I did talk with Eder Burton the [facility] cardiology APRN    Counseling: The emergency provider has spoken with the patient and discussed todays results, in addition to providing specific details for the plan of care and counseling regarding the diagnosis and prognosis. Questions are answered at this time and they are agreeable with the plan.      --------------------------------- IMPRESSION AND DISPOSITION ---------------------------------    IMPRESSION  1. Syncope and collapse    2. Atrial fibrillation, unspecified type ([facility]        DISPOSITION  Disposition: Admit to telemetry  Patient condition is fair      NOTE: This report was transcribed using voice recognition software.  Every effort was made to ensure accuracy; however, inadvertent computerized transcription errors may be present       Fredi Yu DO  04/08/21 1532

## 2021-04-08 NOTE — Clinical Note
Patient Class: Observation [104]   REQUIRED: Diagnosis: Syncope and collapse [780. 2. ICD-9-CM]   Estimated Length of Stay: Estimated stay of less than 2 midnights   Admitting Provider: Radha ELIAS [de-identified]   Telemetry Bed Required?: Yes

## 2021-04-12 ENCOUNTER — TELEPHONE (OUTPATIENT)
Dept: CARDIOLOGY CLINIC | Age: 56
End: 2021-04-12

## 2021-04-13 ENCOUNTER — OFFICE VISIT (OUTPATIENT)
Dept: FAMILY MEDICINE CLINIC | Age: 56
End: 2021-04-13
Payer: COMMERCIAL

## 2021-04-13 VITALS
RESPIRATION RATE: 18 BRPM | DIASTOLIC BLOOD PRESSURE: 80 MMHG | HEART RATE: 80 BPM | OXYGEN SATURATION: 98 % | HEIGHT: 72 IN | TEMPERATURE: 97.3 F | BODY MASS INDEX: 31.15 KG/M2 | SYSTOLIC BLOOD PRESSURE: 128 MMHG | WEIGHT: 230 LBS

## 2021-04-13 DIAGNOSIS — J01.90 ACUTE NON-RECURRENT SINUSITIS, UNSPECIFIED LOCATION: Primary | ICD-10-CM

## 2021-04-13 DIAGNOSIS — J06.9 ACUTE UPPER RESPIRATORY INFECTION, UNSPECIFIED: ICD-10-CM

## 2021-04-13 DIAGNOSIS — R09.81 NASAL CONGESTION: ICD-10-CM

## 2021-04-13 PROCEDURE — 99213 OFFICE O/P EST LOW 20 MIN: CPT | Performed by: PHYSICIAN ASSISTANT

## 2021-04-13 RX ORDER — AMOXICILLIN AND CLAVULANATE POTASSIUM 875; 125 MG/1; MG/1
1 TABLET, FILM COATED ORAL 2 TIMES DAILY
Qty: 20 TABLET | Refills: 0 | Status: SHIPPED | OUTPATIENT
Start: 2021-04-13 | End: 2021-04-23

## 2021-04-13 NOTE — PROGRESS NOTES
21  Magda Pandya : 1965 Sex: male  Age 60 y.o. Subjective:  Chief Complaint   Patient presents with    Sinus Problem         HPI:   Magda Pandya , 60 y.o. male presents to express care for evaluation of sinus congestion, rhinorrhea. The patient has had the symptoms ongoing for last couple of days. The patient was recently in the hospital for a bout of A. fib with RVR. The patient signed out [address] Has a follow-up with cardiology in several weeks. The patient states he is not really having any significant chest pain or discomfort. He has more nasal congestion rhinorrhea. He does have a cough that is nonproductive. No hemoptysis. The patient is anticoagulated. The patient is not having any abdominal pain, back pain, flank pain. He is not having any palpitations      ROS:   Unless otherwise stated in this report the patient's positive and negative responses for review of systems for constitutional, eyes, ENT, cardiovascular, respiratory, gastrointestinal, neurological, , musculoskeletal, and integument systems and related systems to the presenting problem are either stated in the history of present illness or were not pertinent or were negative for the symptoms and/or complaints related to the presenting medical problem. Positives and pertinent negatives as per HPI. All others reviewed and are negative.       PMH:     Past Medical History:   Diagnosis Date    Cancer ([address]     Gastroesophageal reflux disease without esophagitis 2021    HTN (hypertension) 3/15/2019    Hyperlipidemia     Hypertension     New onset a-fib ([address] 2021    NHL (non-Hodgkin's lymphoma) ([address] 2004    [facility]    Overactive thyroid gland        Past Surgical History:   Procedure Laterality Date    JOINT REPLACEMENT Right 2017    OTHER SURGICAL HISTORY  2012    lymph node extraction       Family History   Problem Relation Age of Onset    Other Mother         Age 80, , healthy    Other Father          age 80 unknown cause    No Known Problems Sister        Medications:     Current Outpatient Medications:     Multiple Vitamins-Minerals (CENTRUM SILVER 50+MEN) TABS, Take 1 tablet by mouth daily, Disp: , Rfl:     COVID-19 mRNA Vacc, Moderna, 100 MCG/0.5ML SUSP injection, Inject 0.5 mLs into the muscle once *FIRST DOSE: 2021 SECOND DOSE: 2021*, Disp: , Rfl:     metoprolol tartrate (LOPRESSOR) 25 MG tablet, Take 0.5 tablets by mouth 2 times daily, Disp: 60 tablet, Rfl: 3    apixaban (ELIQUIS) 5 MG TABS tablet, Take 1 tablet by mouth 2 times daily, Disp: 60 tablet, Rfl: 1    rosuvastatin (CRESTOR) 5 MG tablet, Take 5 mg by mouth daily , Disp: , Rfl: 0    Allergies:   No Known Allergies    Social History:     Social History     Tobacco Use    Smoking status: Former Smoker     Packs/day: 1.00     Years: 33.00     Pack years: 33.00     Types: Cigarettes     Start date: 1970     Quit date: 2003     Years since quittin.2    Smokeless tobacco: Current User     Types: Snuff   Substance Use Topics    Alcohol use: Yes     Comment: weekends maybe 1-2 beers    Drug use: Yes     Types: Marijuana       Patient lives at home. Physical Exam:     Vitals:    21 0818   BP: 128/80   Pulse: 80   Resp: 18   Temp: 97.3 °F (36.3 °C)   SpO2: 98%   Weight: 230 lb (104.3 kg)   Height: 6' (1.829 m)       Exam:  Physical Exam  Nurse's notes and vital signs reviewed. The patient is not hypoxic. General: Alert, no acute distress, patient resting comfortably Patient is not toxic or lethargic. Skin: Warm, intact, no pallor noted. There is no evidence of rash at this time. Head: Normocephalic, atraumatic. Eye: Normal conjunctiva  Ears, Nose, Throat: Right tympanic membrane clear, left tympanic membrane clear. No drainage or discharge noted. No pre- or post-auricular tenderness, erythema, or swelling noted. Mild nasal congestion, rhinorrhea, no epistaxis.  No facial erythema. Posterior oropharynx shows erythema and cobblestoning but no evidence of tonsillar hypertrophy, asymmetry or peritonsillar abscess and no evidence of exudate. the uvula is midline. No trismus or drooling is noted. Moist mucous membranes. Neck: No anterior/posterior lymphadenopathy noted. No erythema, no masses, no fluctuance or induration noted. No meningeal signs. Cardio: Regular Rate and Rhythm  Respiratory: No acute distress, no rhonchi, wheezing or crackles noted. No stridor or retractions are noted. Abdomen: Normal bowel sounds, soft, nontender, no masses detected. No rebound, guarding, or rigidity noted. Musculoskeletal: No obvious deformity, no edema, no calf tenderness. No erythema. Neurological: A&O x4, normal speech  Psychiatric: Cooperative         Testing:           Medical Decision Making:     Vital signs reviewed    Past medical history reviewed. Allergies reviewed. Medications reviewed. Patient on arrival does not appear to be in any apparent distress or discomfort. The patient has been seen and evaluated. The patient does not appear to be toxic or lethargic. The patient does sound regular at this point. The patient will continue to monitor signs symptoms. He is not tachycardic. The patient will be treated with Augmentin. Patient is to use decongestant without any pseudoephedrine or other such agent. We will hold off on any steroids at this point. The patient was educated on the proper dosage of motrin and tylenol and the appropriate intervals of each. The patient is to increase fluid intake over the next several days. The patient is to use OTC decongestant as needed. The patient is to return to express care or go directly to the emergency department should any of the signs or symptoms worsen. The patient is to followup with primary care physician in 2-3 days for repeat evaluation.  The patient has no other questions or concerns at this time the patient

## 2022-01-12 ENCOUNTER — APPOINTMENT (OUTPATIENT)
Dept: GENERAL RADIOLOGY | Age: 57
End: 2022-01-12
Payer: COMMERCIAL

## 2022-01-12 ENCOUNTER — HOSPITAL ENCOUNTER (EMERGENCY)
Age: 57
Discharge: HOME | End: 2022-01-12
Attending: EMERGENCY MEDICINE
Payer: COMMERCIAL

## 2022-01-12 VITALS
HEIGHT: 72 IN | HEART RATE: 71 BPM | BODY MASS INDEX: 30.88 KG/M2 | SYSTOLIC BLOOD PRESSURE: 118 MMHG | DIASTOLIC BLOOD PRESSURE: 72 MMHG | RESPIRATION RATE: 16 BRPM | OXYGEN SATURATION: 97 % | TEMPERATURE: 98.5 F | WEIGHT: 228 LBS

## 2022-01-12 DIAGNOSIS — I48.0 PAROXYSMAL ATRIAL FIBRILLATION (HCC): Primary | ICD-10-CM

## 2022-01-12 DIAGNOSIS — R00.2 PALPITATIONS: Primary | ICD-10-CM

## 2022-01-12 DIAGNOSIS — Z86.79 HISTORY OF ATRIAL FIBRILLATION: ICD-10-CM

## 2022-01-12 LAB
ALBUMIN SERPL-MCNC: 3.9 G/DL (ref 3.5–5.2)
ALP BLD-CCNC: 72 U/L (ref 40–129)
ALT SERPL-CCNC: 41 U/L (ref 0–40)
ANION GAP SERPL CALCULATED.3IONS-SCNC: 8 MMOL/L (ref 7–16)
AST SERPL-CCNC: 21 U/L (ref 0–39)
BASOPHILS ABSOLUTE: 0.03 E9/L (ref 0–0.2)
BASOPHILS RELATIVE PERCENT: 0.4 % (ref 0–2)
BILIRUB SERPL-MCNC: 0.6 MG/DL (ref 0–1.2)
BUN BLDV-MCNC: 20 MG/DL (ref 6–20)
CALCIUM SERPL-MCNC: 8.8 MG/DL (ref 8.6–10.2)
CHLORIDE BLD-SCNC: 103 MMOL/L (ref 98–107)
CO2: 25 MMOL/L (ref 22–29)
CREAT SERPL-MCNC: 0.9 MG/DL (ref 0.7–1.2)
EKG ATRIAL RATE: 77 BPM
EKG P AXIS: 53 DEGREES
EKG P-R INTERVAL: 166 MS
EKG Q-T INTERVAL: 380 MS
EKG QRS DURATION: 92 MS
EKG QTC CALCULATION (BAZETT): 430 MS
EKG R AXIS: -36 DEGREES
EKG T AXIS: 22 DEGREES
EKG VENTRICULAR RATE: 77 BPM
EOSINOPHILS ABSOLUTE: 0.11 E9/L (ref 0.05–0.5)
EOSINOPHILS RELATIVE PERCENT: 1.4 % (ref 0–6)
GFR AFRICAN AMERICAN: >60
GFR NON-AFRICAN AMERICAN: >60 ML/MIN/1.73
GLUCOSE BLD-MCNC: 75 MG/DL (ref 74–99)
HCT VFR BLD CALC: 47.4 % (ref 37–54)
HEMOGLOBIN: 15.3 G/DL (ref 12.5–16.5)
IMMATURE GRANULOCYTES #: 0.06 E9/L
IMMATURE GRANULOCYTES %: 0.8 % (ref 0–5)
LYMPHOCYTES ABSOLUTE: 1.15 E9/L (ref 1.5–4)
LYMPHOCYTES RELATIVE PERCENT: 14.8 % (ref 20–42)
MAGNESIUM: 2.1 MG/DL (ref 1.6–2.6)
MCH RBC QN AUTO: 30.6 PG (ref 26–35)
MCHC RBC AUTO-ENTMCNC: 32.3 % (ref 32–34.5)
MCV RBC AUTO: 94.8 FL (ref 80–99.9)
MONOCYTES ABSOLUTE: 0.79 E9/L (ref 0.1–0.95)
MONOCYTES RELATIVE PERCENT: 10.2 % (ref 2–12)
NEUTROPHILS ABSOLUTE: 5.61 E9/L (ref 1.8–7.3)
NEUTROPHILS RELATIVE PERCENT: 72.4 % (ref 43–80)
PDW BLD-RTO: 14 FL (ref 11.5–15)
PLATELET # BLD: 235 E9/L (ref 130–450)
PMV BLD AUTO: 9.2 FL (ref 7–12)
POTASSIUM REFLEX MAGNESIUM: 4.2 MMOL/L (ref 3.5–5)
PRO-BNP: 513 PG/ML (ref 0–125)
RBC # BLD: 5 E12/L (ref 3.8–5.8)
SARS-COV-2, NAAT: NOT DETECTED
SODIUM BLD-SCNC: 136 MMOL/L (ref 132–146)
TOTAL PROTEIN: 6.7 G/DL (ref 6.4–8.3)
TROPONIN, HIGH SENSITIVITY: 11 NG/L (ref 0–11)
TROPONIN, HIGH SENSITIVITY: 9 NG/L (ref 0–11)
TSH SERPL DL<=0.05 MIU/L-ACNC: 1.41 UIU/ML (ref 0.27–4.2)
WBC # BLD: 7.8 E9/L (ref 4.5–11.5)

## 2022-01-12 PROCEDURE — 84484 ASSAY OF TROPONIN QUANT: CPT

## 2022-01-12 PROCEDURE — 85025 COMPLETE CBC W/AUTO DIFF WBC: CPT

## 2022-01-12 PROCEDURE — 99284 EMERGENCY DEPT VISIT MOD MDM: CPT | Performed by: INTERNAL MEDICINE

## 2022-01-12 PROCEDURE — 87635 SARS-COV-2 COVID-19 AMP PRB: CPT

## 2022-01-12 PROCEDURE — 80053 COMPREHEN METABOLIC PANEL: CPT

## 2022-01-12 PROCEDURE — 83735 ASSAY OF MAGNESIUM: CPT

## 2022-01-12 PROCEDURE — 99284 EMERGENCY DEPT VISIT MOD MDM: CPT

## 2022-01-12 PROCEDURE — 93005 ELECTROCARDIOGRAM TRACING: CPT | Performed by: EMERGENCY MEDICINE

## 2022-01-12 PROCEDURE — APPSS45 APP SPLIT SHARED TIME 31-45 MINUTES: Performed by: NURSE PRACTITIONER

## 2022-01-12 PROCEDURE — 71045 X-RAY EXAM CHEST 1 VIEW: CPT

## 2022-01-12 PROCEDURE — 83880 ASSAY OF NATRIURETIC PEPTIDE: CPT

## 2022-01-12 PROCEDURE — 84443 ASSAY THYROID STIM HORMONE: CPT

## 2022-01-12 ASSESSMENT — ENCOUNTER SYMPTOMS
NAUSEA: 0
VOMITING: 0
COUGH: 1
SHORTNESS OF BREATH: 1
RHINORRHEA: 0
DIARRHEA: 0
ABDOMINAL PAIN: 0
CONSTIPATION: 0

## 2022-01-12 NOTE — ED PROVIDER NOTES
Patient is a 47-year-old male with a history of non-Hodgkin's lymphoma, A. fib on anticoagulation who presents to the emergency department with a complaint of intermittent palpitations. Patient states that he had a cough 2 weeks ago and was treated with antibiotics as well as a Medrol Dosepak. Patient states that he did not complete the Medrol Dosepak as he was taking it he developed palpitations intermittently. Patient states that he had palpitations a couple days ago, noted that it was primarily on his left side, and the relieved when he rolled onto his back or set up. Patient noted that he had palpitations again last night that felt like his \"heart beating out of his chest\" which were short-lived and self resolved. Patient denies any chest pain or shortness of breath with these events. Patient additionally talks about his increasing dyspnea on exertion especially when walking up steps. This is new over the last 2 weeks. Patient denies any chest pain with those events. Patient denies any fevers or chills, abdominal pain, urinary or other GI symptoms. Patient is followed by cardiology, has been compliant with his anticoagulation. Review of Systems   Constitutional: Negative for chills and fever. HENT: Negative for congestion and rhinorrhea. Eyes: Negative for visual disturbance. Respiratory: Positive for cough and shortness of breath. Cardiovascular: Positive for palpitations. Negative for chest pain and leg swelling. Gastrointestinal: Negative for abdominal pain, constipation, diarrhea, nausea and vomiting. Genitourinary: Negative for dysuria, frequency, hematuria and urgency. Musculoskeletal: Negative for arthralgias and myalgias. Skin: Negative for rash and wound. Allergic/Immunologic: Negative for immunocompromised state. Neurological: Negative for dizziness, syncope, weakness, light-headedness, numbness and headaches. Psychiatric/Behavioral: Negative for confusion. The patient is not nervous/anxious. Physical Exam  Vitals and nursing note reviewed. Constitutional:       General: He is not in acute distress. Appearance: He is not ill-appearing or toxic-appearing. HENT:      Head: Normocephalic and atraumatic. Mouth/Throat:      Mouth: Mucous membranes are moist.      Pharynx: Oropharynx is clear. Eyes:      Pupils: Pupils are equal, round, and reactive to light. Cardiovascular:      Rate and Rhythm: Normal rate and regular rhythm. Pulses: Normal pulses. Heart sounds: Normal heart sounds. Pulmonary:      Effort: Pulmonary effort is normal.      Breath sounds: Normal breath sounds. Abdominal:      General: There is no distension. Palpations: Abdomen is soft. Tenderness: There is no abdominal tenderness. Musculoskeletal:         General: Normal range of motion. Skin:     General: Skin is warm. Capillary Refill: Capillary refill takes less than 2 seconds. Neurological:      General: No focal deficit present. Mental Status: He is alert and oriented to person, place, and time. Cranial Nerves: No cranial nerve deficit. Sensory: No sensory deficit. Motor: No weakness. MDM  Number of Diagnoses or Management Options  History of atrial fibrillation  Palpitations  Diagnosis management comments: Patient is a 58-year-old male who presents to the emergency department with complaint of palpitations. Patient presents awake, alert, following all commands, no apparent distress, nontoxic. Vital signs were noted. Physical exam is unremarkable. EKG shows sinus mechanism without A. fib. Patient does have a history of A. fib, is on anticoagulation for which he is compliant. Patient work-up including labs show no significant findings. Patient does not have symptoms of palpitation at this time, they are not reproducible. X-ray shows no acute process.   Patient does have a heart score of 4, PERC and Wells are negative and low respectively. As patient does have a strong cardiac history and cardiac testing including echo and stress test have been many years ago, cardiology was consulted and able to see the patient at bedside. They recommend patient able to be discharged today to follow-up tomorrow morning with cardiology outpatient for likely Holter monitor as well as further cardiology evaluation. Plan was discussed with patient, he is asymptomatic, there were no return of symptoms during ED monitoring. patient is agreeable to plan. Patient will follow-up with cardiology tomorrow. All questions were answered at the bedside patient was able to be discharged in stable condition. Amount and/or Complexity of Data Reviewed  Clinical lab tests: ordered and reviewed  Tests in the radiology section of CPT®: ordered and reviewed       EKG: This EKG is signed and interpreted by me. Rate: 77  Rhythm: Sinus  Interpretation: Normal sinus rhythm, normal [place] interval, normal QRS, normal QT interval, no acute ST or T wave changes  Comparison: changes compared to previous EKG    --------------------------------------------- PAST HISTORY ---------------------------------------------  Past Medical History:  has a past medical history of Cancer ([address] Gastroesophageal reflux disease without esophagitis, HTN (hypertension), Hyperlipidemia, Hypertension, [facility] onset [facility]fib ([facility] NHL (non-Hodgkin's lymphoma) ([facility] and Overactive thyroid gland. Past Surgical History:  has a past surgical history that includes joint replacement (Right, 2017) and other surgical history (2012). Social History:  reports that he quit smoking about 19 years ago. His smoking use included cigarettes. He started smoking about 51 years ago. He has a 33.00 pack-year smoking history. His smokeless tobacco use includes snuff. He reports current alcohol use. He reports current drug use. Frequency: 7.00 times per week. Drug: Marijuana Keila Cristina).     Family History: family history includes No Known Problems in his sister; Other in his father and mother. The patients home medications have been reviewed. Allergies: Patient has no known allergies.     -------------------------------------------------- RESULTS -------------------------------------------------  Labs:  Results for orders placed or performed during the hospital encounter of 01/12/22   COVID-19, Rapid    Specimen: Nasopharyngeal Swab   Result Value Ref Range    SARS-CoV-2, NAAT Not Detected Not Detected   CBC Auto Differential   Result Value Ref Range    WBC 7.8 4.5 - 11.5 E9/L    RBC 5.00 3.80 - 5.80 E12/L    Hemoglobin 15.3 12.5 - 16.5 g/dL    Hematocrit 47.4 37.0 - 54.0 %    MCV 94.8 80.0 - 99.9 fL    MCH 30.6 26.0 - 35.0 pg    MCHC 32.3 32.0 - 34.5 %    RDW 14.0 11.5 - 15.0 fL    Platelets 276 320 - 203 E9/L    MPV 9.2 7.0 - 12.0 fL    Neutrophils % 72.4 43.0 - 80.0 %    Immature Granulocytes % 0.8 0.0 - 5.0 %    Lymphocytes % 14.8 (L) 20.0 - 42.0 %    Monocytes % 10.2 2.0 - 12.0 %    Eosinophils % 1.4 0.0 - 6.0 %    Basophils % 0.4 0.0 - 2.0 %    Neutrophils Absolute 5.61 1.80 - 7.30 E9/L    Immature Granulocytes # 0.06 E9/L    Lymphocytes Absolute 1.15 (L) 1.50 - 4.00 E9/L    Monocytes Absolute 0.79 0.10 - 0.95 E9/L    Eosinophils Absolute 0.11 0.05 - 0.50 E9/L    Basophils Absolute 0.03 0.00 - 0.20 E9/L   Comprehensive Metabolic Panel w/ Reflex to MG   Result Value Ref Range    Sodium 136 132 - 146 mmol/L    Potassium reflex Magnesium 4.2 3.5 - 5.0 mmol/L    Chloride 103 98 - 107 mmol/L    CO2 25 22 - 29 mmol/L    Anion Gap 8 7 - 16 mmol/L    Glucose 75 74 - 99 mg/dL    BUN 20 6 - 20 mg/dL    CREATININE 0.9 0.7 - 1.2 mg/dL    GFR Non-African American >60 >=60 mL/min/1.73    GFR African American >60     Calcium 8.8 8.6 - 10.2 mg/dL    Total Protein 6.7 6.4 - 8.3 g/dL    Albumin 3.9 3.5 - 5.2 g/dL    Total Bilirubin 0.6 0.0 - 1.2 mg/dL    Alkaline Phosphatase 72 40 - 129 U/L    ALT 41 (H) 0 - 40 U/L    AST 21 0 - 39 U/L   Troponin   Result Value Ref Range    Troponin, High Sensitivity 9 0 - 11 ng/L   Brain Natriuretic Peptide   Result Value Ref Range    Pro- (H) 0 - 125 pg/mL   Magnesium   Result Value Ref Range    Magnesium 2.1 1.6 - 2.6 mg/dL   TSH WITHOUT REFLEX   Result Value Ref Range    TSH 1.410 0.270 - 4.200 uIU/mL   Troponin   Result Value Ref Range    Troponin, High Sensitivity 11 0 - 11 ng/L   EKG 12 Lead   Result Value Ref Range    Ventricular Rate 77 BPM    Atrial Rate 77 BPM    P-R Interval 166 ms    QRS Duration 92 ms    Q-T Interval 380 ms    QTc Calculation (Bazett) 430 ms    P Axis 53 degrees    R Axis -36 degrees    T Axis 22 degrees       Radiology:  XR CHEST PORTABLE   Final Result   No acute process. ------------------------- NURSING NOTES AND VITALS REVIEWED ---------------------------  Date / Time Roomed:  1/12/2022  8:28 AM  ED Bed Assignment:  14/14    The nursing notes within the ED encounter and vital signs as below have been reviewed. /71   Pulse 66   Temp 96.8 °F (36 °C) (Temporal)   Resp 16   Ht 6' (1.829 m)   Wt 228 lb (103.4 kg)   SpO2 97%   BMI 30.92 kg/m²   Oxygen Saturation Interpretation: Normal      ------------------------------------------ PROGRESS NOTES ------------------------------------------  6:03 PM EST  I have spoken with the patient and discussed todays results, in addition to providing specific details for the plan of care and counseling regarding the diagnosis and prognosis. Their questions are answered at this time and they are agreeable with the plan. I discussed at length with them reasons for immediate return here for re evaluation. They will followup with their cardiologist by calling their office tomorrow.     --------------------------------- ADDITIONAL PROVIDER NOTES ---------------------------------  At this time the patient is without objective evidence of an acute process requiring hospitalization or inpatient management. They have remained hemodynamically stable throughout their entire ED visit and are stable for discharge with outpatient follow-up. The plan has been discussed in detail and they are aware of the specific conditions for emergent return, as well as the importance of follow-up. New Prescriptions    No medications on file     Diagnosis:  1. Palpitations    2. History of atrial fibrillation      Disposition:  Patient's disposition: Discharge to home  Patient's condition is stable. 1/12/22, 6:03 PM EST.     This note is prepared by Nia Walker MD -PGY-2                     Nia Walker MD  Resident  01/12/22 0295

## 2022-01-12 NOTE — CONSULTS
So past I want to screen for until I tell you      Inpatient Cardiology Consultation      Reason for Consult: Exertional dyspnea    Consulting Physician: Dr. Laly Keita    Requesting Physician:  Dr. Shareen Peabody    Date of Consultation: 1/12/2022    HISTORY OF PRESENT ILLNESS:     This 57-year-old male is known to [facility] cardiology and is followed by Dr. Carl Chavez.  He was last evaluated as an outpatient 1/28/2021. He has a history of paroxysmal atrial fibrillation with spontaneous conversion. He was then seen in April 2021 by cardiology when he presented with recurrent atrial fibrillation. He was switched to atenolol but is not taking that medication. Last Thursday he felt a \"cold \"coming on. He went to see primary care and was diagnosed with sinusitis. He was treated with steroids and antibiotics and his symptoms resolved. At home COVID test was negative. Over the weekend he felt \"palpitations. On Friday and Saturday. He attributed it to use of the steroids and he thought he was back in atrial fibrillation. However by Sunday he felt better and had no palpitations. This morning while coming up the steps from washing close he suddenly became winded and slightly diaphoretic. He had no chest pain. There was a mild dyspnea and he did not have to stop to rest.  However his wife asked him to come to the emergency room to be checked. He has had no dizziness lightheadedness, orthopnea or swelling of the lower extremities. Blood pressure on admission was 113/74 and he was afebrile with no hypoxia on room air. Potassium was 4.2 with BUN of 20 and a creatinine of 0.9 and a magnesium of 2.1, troponin 11, BNP 8/5/2013, TSH 1.4, WBC seven and H&H 15.3 and 27.4. COVID-19 is pending     EKG shows sinus rhythm with a left axis deviation and nonspecific ST-T wave changes. His last EKG had shown atrial fibrillation. Chest x-ray unremarkable.     He admits to drinking moonshine recently but does not give details      Past Medical History:      1. Hypertension  2. Hyperlipidemia: On Crestor  3. Reported sleep study showing no obstructive sleep apnea  4. Tobacco abuse: Quit smoking cigarettes in 2008 (8-pack-year smoker), chewing tobacco use (1 can lasting approximately 2 days; x10 years). 5.  History of non-Hodgkin's lymphoma: Treated with R-CHOP (increased fluids Adriamycin) and Rituxan. ? Stage III, grade 2, follicular lymphoma diagnosed August 2004 s/p R-CHOP x6 and then131-iodine tositumomab, complete in March 2005 (DE); relapsed with grade 1-2 follicular lymphoma involving a single left inguinal node, which was completely excised in September 2012, s/p rituximab weekly x4, completed in October 2012 (CR); relapsed with grade 1-2 follicular lymphoma involving a single left inguinal lymph node, s/p complete excision in April 2016 and then rituximab weekly x4, completed in May 2016 (CR); progressed in March 2019, s/p 2400 cGy radiation therapy to the left inguinal region, completed in May 2019 (CR). ? Per hematology/oncology note in outpatient 2/1/7786 [facility]: Follicular lymphoma is stable. Plan for follow-up in 1/2021.  6.  Bradycardia  ? 24-hour event monitor 10/12/2012 showing average heart rate 67 bpm, minimum heart rate 41 bpm (at 3:30 AM while sleeping), max heart rate 157 bpm (while hunting), PVCs (1.51% of all beats), and PAC (0.73% of all beats). 7.  Echocardiogram: 11/7/2012: EF 55-60%, pulmonary artery systolic pressure is normal, normal diastolic function, trace MR, mild pulmonic regurgitation, mild TR, global right ventricular systolic function is normal.   8.  Exercise MPS: 7/16/2018 showing LVEF 61%, no exercise-induced perfusion defect, 85% target heart rate: 140 bpm.  [place] > 5.   9.  OP visit Dr. Afua Garcia 3/15/2019 for evaluation of tachycardia. Per Dr. Afua Garcia, isolated heart rate of 100 bpm, especially in light of visit to oncology for recurrent cancer is within normal limits.   Patient was recommended to have an outpatient echocardiogram --has had to be completed. 10.  GERD: On Protonix  11. New onset Afib with RVR (duration unknown, captured on EKG 1/2/2021) heart rate 110-140, started on Lopressor 25 twice daily.: [id number] score at least 1 (HTN). Spontaneously converted  12. Anxiety/depression. 13.  Obesity   14. Hypothyroidism  15. Joint replacement in 2017  16. Recurrent atrial fibrillation April 8, 2021, was not taking anticoagulation/beta-blockers due to not feeling well after second COVID-vaccine, switch to atenolol which he was not taking  17. Orthostatic hypotension  18. Outpatient \"sinus infection, January 2021, rapid home test for COVID-19 negative   19. Noncompliance with medication   20. Drinks moonshine     Allergies:  Patient has no known allergies.     Social History:    Patient lives with his wife. He is a retired  and is currently on disability. He denies illicit drug use. He is a former cigarette smoker: Quit 2008 (8-pack-year smoker). He currently chews tobacco (1 can lasting 1.5 days) x10 years.   drinks moonshine    Family History: Patient denies any family history significant for premature CAD.       Current Medications:    No current facility-administered medications for this encounter. Home Meds  Metoprolol 25 mg bid  Lisinopril 20 mg daily  crestor 5 mg daily    REVIEW OF SYSTEMS:     · Constitutional: Denies fatigue, fevers, chills or night sweats  · Eyes: Denies visual changes or drainage  · ENT: Denies headaches or hearing loss. No mouth sores or sore throat. No epistaxis   · Cardiovascular: Denies chest pain, pressure or palpitations. No lower extremity swelling. · Respiratory: Denies VASQUEZ, cough, orthopnea or PND. No hemoptysis   · Gastrointestinal: Denies hematemesis or anorexia. No hematochezia or melena    · Genitourinary: Denies urgency, dysuria or hematuria.   · Musculoskeletal: Denies gait disturbance, weakness or joint complaints  · Integumentary: Denies rash, hives or pruritis   · Neurological: Denies dizziness, headaches or seizures. No numbness or tingling  · Psychiatric: Denies anxiety or depression. · Endocrine: Denies temperature intolerance. No recent weight change. .  · Hematologic/Lymphatic: Denies abnormal bruising or bleeding. No swollen lymph nodes    PHYSICAL EXAM:   /69   Pulse 51   Temp 96.8 °F (36 °C) (Temporal)   Resp 16   Ht 6' (1.829 m)   Wt 228 lb (103.4 kg)   SpO2 99%   BMI 30.92 kg/m²   CONST:  Well developed, well nourished who appears of stated age. Awake, alert and cooperative. No apparent distress. HEENT:   Head- Normocephalic, atraumatic   Eyes- Conjunctivae pink, anicteric  Throat- Oral mucosa pink and moist  Neck-  No stridor, trachea midline, no jugular venous distention. No carotid bruit. CHEST: Chest symmetrical and non-tender to palpation. No accessory muscle use or intercostal retractions  RESPIRATORY: Lung sounds - clear throughout fields   CARDIOVASCULAR:     Heart Inspection- shows no noted pulsations  Heart Palpation- no heaves or thrills; PMI is non-displaced   Heart Ausculation- Regular rate and rhythm, no murmur. No s3, s4 or rub   PV: No lower extremity edema. No varicosities. Pedal pulses palpable, no clubbing or cyanosis   ABDOMEN: Soft, non-tender to light palpation. Bowel sounds present. No palpable masses no organomegaly; no abdominal bruit  MS: Good muscle strength and tone. No atrophy or abnormal movements. : Deferred  SKIN: Warm and dry no statis dermatitis or ulcers   NEURO / PSYCH: Oriented to person, place and time. Speech clear and appropriate. Follows all commands.  Pleasant affect     DATA:    ECG / Tele strips: Sinus rhythm  Diagnostic:    No intake or output data in the 24 hours ending 01/12/22 1245    Labs:   CBC:   Recent Labs     01/12/22  0926   WBC 7.8   HGB 15.3   HCT 47.4        BMP:   Recent Labs     01/12/22  0926      K 4.2   CO2 25   BUN 20   CREATININE 0.9   LABGLOM >60   CALCIUM 8.8     Mag:   Recent Labs     01/12/22  0926   MG 2.1     Phos: No results for input(s): PHOS in the last 72 hours. TFT:   Lab Results   Component Value Date    TSH 1.410 01/12/2022    T4FREE 1.13 01/02/2021      HgA1c: No results found for: LABA1C  No results found for: EAG  proBNP:   Recent Labs     01/12/22 0926   PROBNP 513*     PT/INR: No results for input(s): PROTIME, INR in the last 72 hours. APTT:No results for input(s): APTT in the last 72 hours. CARDIAC ENZYMES:  Recent Labs     01/12/22  0926 01/12/22  1126   TROPHS 9 11     FASTING LIPID PANEL:No results found for: CHOL, HDL, LDLDIRECT, LDLCALC, TRIG  LIVER PROFILE:  Recent Labs     01/12/22 0926   AST 21   ALT 41*   LABALBU 3.9       Assessment discussed with   1. dyspnea on exertion with unremarkable chest x-ray and proBNP 513 with normal left ventricular systolic function on echocardiogram  2. Paroxysmal Afib with RVR (duration unknown, captured on EKG 1/2/2021)  and converted to sinus rhythm and started on oral anticoagulation and beta-blockers with recurrent paroxysmal atrial fibrillation in April 2021 when he had stopped taking all of his medication, [id number] of 1. Now with palpitations , ? recurrent atrial fibrillation  3.   recent sinus infection  4. Hypertension: Controlled  5. Hyperlipidemia: On Crestor  6. Current tobacco use (chews); former cigarette use. 7.  Obesity  8. GERD  9. History of non-Hodgkin's lymphoma treated with R-CHOP and Rituxan. More recently treated with rituximab 2016 and radiation therapy in 5/2019. Currently stable per [facility] oncology/hematology. 10. Probable CEE  11. nonischemic stress test.  In 2018  12. Bradycardia 24-hour event monitor in October 2012 with a minimum heart rate of 41 at 3:30 AM while sleeping and occasional PVCs and PACs  13. anxiety and depression. 14.  Hypothyroidism  15. Drinks moonshine    Plans  1.  Rapid COVID now negative  2. May go home from cardiology standpoint  3. DAR AT to be placed prior to discharge and EKG dept. notified  4. Outpatient stress and echo  5. Follow up with Dr. Nasrin José    Electronically signed by KLEVER White CNP on 1/12/2022 at 12:45 PM     Patient seen and examined case discussed in detail with cardiology nurse practitioner. Patient report she recently had a friend bringing him some moonshine for which he took couple of shots. After that then he developed some palpitations. He report the palpitation resolved and days later he wanted to be checked just to make sure everything is fine. By the time he presented to the emergency room patient now in sinus rhythm. He report doing well and denies any chest pain and request to be discharged home and follow-up with his cardiologist.  We have arranged a Zio patch heart monitor and patient will follow-up with his cardiologist to arrange for outpatient echo and stress test.  Patient is advised to return to the emergency room if recurrent symptoms and he verbalized understanding.

## 2022-01-13 ENCOUNTER — TELEPHONE (OUTPATIENT)
Dept: CARDIOLOGY CLINIC | Age: 57
End: 2022-01-13

## 2022-01-13 DIAGNOSIS — I48.91 ATRIAL FIBRILLATION, UNSPECIFIED TYPE (HCC): Primary | ICD-10-CM

## 2022-01-13 NOTE — TELEPHONE ENCOUNTER
Per Tarun Patel NP note from 1/12/22:    Plans  1. Rapid COVID now negative  2. May go home from cardiology standpoint  3. DAR AT to be placed prior to discharge and EKG dept. notified  4. Outpatient stress and echo  5.  Follow up with Dr. Luisa May

## 2022-01-14 NOTE — TELEPHONE ENCOUNTER
Proceed with cardiac monitor 1st and then can reassess in the office thereafter in 2/2022 for further testing

## 2022-01-19 ENCOUNTER — NURSE ONLY (OUTPATIENT)
Dept: CARDIOLOGY CLINIC | Age: 57
End: 2022-01-19

## 2022-01-19 NOTE — PROGRESS NOTES
Patient was seen in office for the placement of a 14 day Zio AT monitor per Dr. Gus Bland.  Patient tolerated well and understood instructions.     Serial Number: [id number]      [facility]

## 2022-02-03 ENCOUNTER — OFFICE VISIT (OUTPATIENT)
Dept: CARDIOLOGY CLINIC | Age: 57
End: 2022-02-03
Payer: COMMERCIAL

## 2022-02-03 VITALS
SYSTOLIC BLOOD PRESSURE: 126 MMHG | RESPIRATION RATE: 12 BRPM | HEART RATE: 45 BPM | HEIGHT: 72 IN | BODY MASS INDEX: 31.83 KG/M2 | WEIGHT: 235 LBS | DIASTOLIC BLOOD PRESSURE: 80 MMHG

## 2022-02-03 DIAGNOSIS — I10 ESSENTIAL HYPERTENSION: ICD-10-CM

## 2022-02-03 DIAGNOSIS — I48.0 PAF (PAROXYSMAL ATRIAL FIBRILLATION) (HCC): Primary | ICD-10-CM

## 2022-02-03 DIAGNOSIS — Z79.01 CHRONIC ANTICOAGULATION: ICD-10-CM

## 2022-02-03 PROCEDURE — G8484 FLU IMMUNIZE NO ADMIN: HCPCS | Performed by: INTERNAL MEDICINE

## 2022-02-03 PROCEDURE — 99214 OFFICE O/P EST MOD 30 MIN: CPT | Performed by: INTERNAL MEDICINE

## 2022-02-03 PROCEDURE — 93000 ELECTROCARDIOGRAM COMPLETE: CPT | Performed by: INTERNAL MEDICINE

## 2022-02-03 PROCEDURE — G8427 DOCREV CUR MEDS BY ELIG CLIN: HCPCS | Performed by: INTERNAL MEDICINE

## 2022-02-03 PROCEDURE — 4004F PT TOBACCO SCREEN RCVD TLK: CPT | Performed by: INTERNAL MEDICINE

## 2022-02-03 PROCEDURE — 3017F COLORECTAL CA SCREEN DOC REV: CPT | Performed by: INTERNAL MEDICINE

## 2022-02-03 PROCEDURE — G8417 CALC BMI ABV UP PARAM F/U: HCPCS | Performed by: INTERNAL MEDICINE

## 2022-02-03 NOTE — PROGRESS NOTES
OUTPATIENT CARDIOLOGY FOLLOW-UP    Name: Jaja Dyson    Age: 59 y.o. Primary Care Physician: Phi Brown MD    Date of Service: 2/3/2022    Chief Complaint: Follow-up for atrial fibrillation    Interim History:  Admitted to [facility] in 1/2021 for further evaluation of palpitations, tachycardia, and associated SOB --> atrial fibrillation on EKG and atrial fibrillation with episodes of RVR on telemetry --> BB started and he spontaneously converted to SR prior to hospital discharge. +recurrent AF episodes since then and he spontaneously converted to SR. Metoprolol dose increased from 12.5 mg BID to 25 mg BID ~ 1 month ago (recently with less fatigue and less frequent palpitations). He recently wore a cardiac monitor (results pending). He reports, \"I feel fantastic. Jeana Saunders I'm strong as an ox\". He denies recent chest pain, SOB, syncope, PND, or orthopnea. SB on EKG today. Review of Systems:   Cardiac: As per HPI  General: No fever, chills  Pulmonary: As per HPI  HEENT: No visual disturbances, difficult swallowing  GI: No nausea, vomiting  : No dysuria, hematuria  Endocrine: No thyroid disease or DM  Musculoskeletal: MARTELL x 4, no focal motor deficits  Skin: Intact, no rashes  Neuro: No headache, seizures  Psych: Currently with no depression, anxiety    Past Medical History:  Past Medical History:   Diagnosis Date    [facility] [address]     Gastroesophageal reflux disease without esophagitis 1/2/2021    HTN (hypertension) 3/15/2019    Hyperlipidemia     Hypertension     New onset a-fib ([facility] [address] 1/2/2021    NHL (non-Hodgkin's lymphoma) ([facility] [address] 2004    [facility]    Overactive thyroid gland        1. Hypertension  2. Hyperlipidemia: On Crestor  3. Reported sleep study showing no obstructive sleep apnea  4. Tobacco abuse: Quit smoking cigarettes in 2008 (8-pack-year smoker), chewing tobacco use (1 can lasting approximately 2 days; x10 years).   5.  History of non-Hodgkin's lymphoma: Treated with R-CHOP (increased fluids Adriamycin) and Rituxan. ? Stage III, grade 2, follicular lymphoma diagnosed August 2004 s/p R-CHOP x6 and then131-iodine tositumomab, complete in March 2005 (ND); relapsed with grade 1-2 follicular lymphoma involving a single left inguinal node, which was completely excised in September 2012, s/p rituximab weekly x4, completed in October 2012 (CR); relapsed with grade 1-2 follicular lymphoma involving a single left inguinal lymph node, s/p complete excision in April 2016 and then rituximab weekly x4, completed in May 2016 (CR); progressed in March 2019, s/p 2400 cGy radiation therapy to the left inguinal region, completed in May 2019 (CR). ? Per hematology/oncology note in outpatient 2/7/5656 [facility]: Follicular lymphoma is stable. Plan for follow-up in 1/2021.  6.  Bradycardia  ? 24-hour event monitor 10/12/2012 showing average heart rate 67 bpm, minimum heart rate 41 bpm (at 3:30 AM while sleeping), max heart rate 157 bpm (while hunting), PVCs (1.51% of all beats), and PAC (0.73% of all beats). 7.  Echocardiogram: 11/7/2012: EF 55-60%, pulmonary artery systolic pressure is normal, normal diastolic function, trace MR, mild pulmonic regurgitation, mild TR, global right ventricular systolic function is normal.   8.  Exercise MPS: 7/16/2018 showing LVEF 61%, no exercise-induced perfusion defect, 85% target heart rate: 140 bpm.  [place] > 5.   9.  OP visit Dr. Jeannine Steele 3/15/2019 for evaluation of tachycardia. Per Dr. Jeannine Steele, isolated heart rate of 100 bpm, especially in light of visit to oncology for recurrent cancer is within normal limits. Patient was recommended to have an outpatient echocardiogram --has had to be completed. 10.  GERD: On Protonix  11. New onset Afib with RVR (duration unknown, captured on EKG 1/2/2021) heart rate 110-140, started on Lopressor 25 twice daily.: [id number] score at least 1 (HTN). Scheduled for DARRON/DCCV 1/4/2021. 12. Anxiety/depression.   13.  Obesity: BMI 30.9    Past Surgical History:  Past Surgical History:   Procedure Laterality Date    JOINT REPLACEMENT Right 2017    OTHER SURGICAL HISTORY  2012    lymph node extraction       Family History:  Family History   Problem Relation Age of Onset    Other Mother         Age 80, , healthy   Lucas Other Father          age 80 unknown cause    No Known Problems Sister        Social History:  Social History     Socioeconomic History    Marital status:      Spouse name: Not on file    Number of children: Not on file    Years of education: Not on file    Highest education level: Not on file   Occupational History    Not on file   Tobacco Use    Smoking status: Former Smoker     Packs/day: 1.00     Years: 33.00     Pack years: 33.00     Types: Cigarettes     Start date: 1970     Quit date: 2003     Years since quittin.1    Smokeless tobacco: Current User     Types: Snuff   Vaping Use    Vaping Use: Never used   Substance and Sexual Activity    Alcohol use: Yes     Comment: weekends maybe 1-2 beers    Drug use: Yes     Frequency: 7.0 times per week     Types: Marijuana Veldon Bunting)     Comment: daily    Sexual activity: Not Currently     Partners: Female   Other Topics Concern    Not on file   Social History Narrative    Not on file     Social Determinants of Health     Financial Resource Strain:     Difficulty of Paying Living Expenses: Not on file   Food Insecurity:     Worried About [address] in the Last Year: Not on file    [address] in the Last Year: Not on file   Transportation Needs:     Lack of Transportation (Medical): Not on file    Lack of Transportation (Non-Medical):  Not on file   Physical Activity:     Days of Exercise per Week: Not on file    Minutes of Exercise per Session: Not on file   Stress:     Feeling of Stress : Not on file   Social Connections:     Frequency of Communication with Friends and Family: Not on file    Frequency of Social Gatherings with Friends and Family: Not on file    Attends Quaker Services: Not on file    Active Member of Clubs or Organizations: Not on file    Attends Club or Organization Meetings: Not on file    Marital Status: Not on file   Intimate Partner Violence:     Fear of Current or Ex-Partner: Not on file    Emotionally Abused: Not on file    Physically Abused: Not on file    Sexually Abused: Not on file   Housing Stability:     Unable to Pay for Housing in the Last Year: Not on file    Number of [place] in the Last Year: Not on file    Unstable Housing in the Last Year: Not on file       Allergies:  No Known Allergies    Current Medications:  Current Outpatient Medications   Medication Sig Dispense Refill    Multiple Vitamins-Minerals (CENTRUM SILVER 50+MEN) TABS Take 1 tablet by mouth daily      metoprolol tartrate (LOPRESSOR) 25 MG tablet Take 0.5 tablets by mouth 2 times daily (Patient taking differently: Take 25 mg by mouth 2 times daily ) 60 tablet 3    apixaban (ELIQUIS) 5 MG TABS tablet Take 1 tablet by mouth 2 times daily 60 tablet 1    rosuvastatin (CRESTOR) 5 MG tablet Take 5 mg by mouth daily   0    COVID-19 mRNA Vacc, Moderna, 100 MCG/0.5ML SUSP injection Inject 0.5 mLs into the muscle once *FIRST DOSE: 03/08/2021  SECOND DOSE: 04/05/2021* (Patient not taking: Reported on 2/3/2022)       No current facility-administered medications for this visit. Physical Exam:  /80   Pulse (!) 45   Resp 12   Ht 6' (1.829 m)   Wt 235 lb (106.6 kg)   BMI 31.87 kg/m²   Wt Readings from Last 3 Encounters:   02/03/22 235 lb (106.6 kg)   01/12/22 228 lb (103.4 kg)   04/13/21 230 lb (104.3 kg)     Appearance: Awake, alert, no acute respiratory distress  Skin: Intact, no rash  Head: Normocephalic, atraumatic  Eyes: EOMI, no conjunctival erythema  ENMT: No pharyngeal erythema, MMM, no rhinorrhea  Neck: Supple, no elevated JVP, no carotid bruits  Lungs: Clear to auscultation bilaterally.  No wheezes, rales, or rhonchi. Cardiac: RRR, no murmurs apparent  Abdomen: Soft, nontender, +bowel sounds  Extremities: Moves all extremities x 4, no lower extremity edema  Neurologic: No focal motor deficits apparent, normal mood and affect    Laboratory Tests:  Lab Results   Component Value Date    CREATININE 0.9 01/12/2022    BUN 20 01/12/2022     01/12/2022    K 4.2 01/12/2022     01/12/2022    CO2 25 01/12/2022     Lab Results   Component Value Date    MG 2.1 01/12/2022     Lab Results   Component Value Date    WBC 7.8 01/12/2022    HGB 15.3 01/12/2022    HCT 47.4 01/12/2022    MCV 94.8 01/12/2022     01/12/2022     Lab Results   Component Value Date    ALT 41 (H) 01/12/2022    AST 21 01/12/2022    ALKPHOS 72 01/12/2022    BILITOT 0.6 01/12/2022     Lab Results   Component Value Date    TROPONINI <0.01 04/08/2021    TROPONINI <0.01 01/02/2021     Lab Results   Component Value Date    INR 1.2 04/08/2021    INR 0.9 01/02/2021    PROTIME 13.0 (H) 04/08/2021    PROTIME 10.2 01/02/2021     Lab Results   Component Value Date    TSH 1.410 01/12/2022     No results found for: LABA1C  No results found for: EAG  No results found for: CHOL  No results found for: TRIG  No results found for: HDL  No results found for: LDLCALC, LDLCHOLESTEROL  No results found for: LABVLDL, VLDL  No results found for: CHOLHDLRATIO  No results for input(s): PROBNP in the last 72 hours.     Cardiac Tests:  EKG reviewed (EKG date: 2/3/2022): SB, rate 45, PRWP    EKG reviewed (EKG date: 1/12/2022): SR, rate 77, NSSTT changes    EKG reviewed (EKG date: 4/8/2021): atrial fibrillation, rate 85, PRWP    EKG reviewed (EKG date: 1/28/2021): SB, rate 52, LAD, PRWP    EKG reviewed (EKG date: 1/2/2021): atrial fibrillation, rate 92, LAD, NSSTT changes     Telemetry reviewed (date: 1/3/2021): atrial fibrillation, rate 80's-140's    24-hour event monitor 10/12/2012 showing average heart rate 67 bpm, minimum heart rate 41 bpm (at 3:30 AM while sleeping), max heart rate 157 bpm (while hunting), PVCs (1.51% of all beats), and PAC (0.73% of all beats).    Echocardiogram: 11/7/2012: EF 55-60%, pulmonary artery systolic pressure is normal, normal diastolic function, trace MR, mild pulmonic regurgitation, mild TR, global right ventricular systolic function is normal.      Exercise MPS: 7/16/2018 showing LVEF 61%, no exercise-induced perfusion defect, 85% target heart rate: 140 bpm. [place] > 5.     ASSESSMENT / PLAN:  1. Paroxysmal atrial fibrillation -- maintaining sinus, compliant with taking BB and eliquis  2. Normal LVEF on TTE 2012 and mild TR, mild pulmonic regurgitation   3. Nonischemic Exercise MPS (7/2018). 4. Hypertension: typically controlled, BP today 130/78  5. Hyperlipidemia: on crestor  6. Current tobacco use (chews); former cigarette use. 7.  Obesity: BMI 30.9 --> 32.3 --> 31.9  8. GERD  9. History of non-Hodgkin's lymphoma treated with R-CHOP and Rituxan. More recently treated with rituximab 2016 and radiation therapy in 5/2019. Currently stable per [facility] oncology/hematology. 10. Possible CEE    - Repeat echocardiogram ordered today  - Metoprolol and eliquis both added during 1/2021 hospitalization. Metoprolol dose increased from 12.5 mg BID to 25 mg BID ~ 1 month ago (recently with less fatigue and less frequent palpitations). He reports, \"I feel fantastic. Tanner Hansen I'm strong as an ox\". SB on EKG today. Continue OAC.  - Follow-up results of 14 day Zio AT monitor (patient returned device yesterday)  - Outpatient sleep study if no prior study  - Aggressive risk factor modifications / tobacco cessation  - Again, discussed option of EP evaluation    Greater than 30 minutes was spent counseling the patient, reviewing the rationale for the above recommendations and reviewing the patient's current medication list, problem list and results of all previously ordered testing.     Lupe Ferrara MD  [facility]) Cardiology

## 2022-02-11 DIAGNOSIS — I48.91 ATRIAL FIBRILLATION, UNSPECIFIED TYPE (HCC): ICD-10-CM

## 2022-04-27 ENCOUNTER — PREP FOR PROCEDURE (OUTPATIENT)
Dept: SURGERY | Age: 57
End: 2022-04-27

## 2022-04-27 RX ORDER — SODIUM CHLORIDE 9 MG/ML
INJECTION, SOLUTION INTRAVENOUS CONTINUOUS
Status: CANCELLED | OUTPATIENT
Start: 2022-04-27

## 2022-05-20 NOTE — TELEPHONE ENCOUNTER
Dr. Lina Liao, please advise regarding what type of stress test. Patient Needs Assistance to Leave Residence...

## 2022-07-07 ENCOUNTER — TELEPHONE (OUTPATIENT)
Dept: CARDIOLOGY | Age: 57
End: 2022-07-07

## 2022-07-07 NOTE — TELEPHONE ENCOUNTER
CALLED PATIENT AND LEFT MESSAGE TO SCHEDULE ECHO.     Electronically signed by Janice Ceballos on 7/7/2022 at 2:25 PM

## 2022-09-25 ENCOUNTER — OFFICE VISIT (OUTPATIENT)
Dept: FAMILY MEDICINE CLINIC | Age: 57
End: 2022-09-25
Payer: COMMERCIAL

## 2022-09-25 VITALS
OXYGEN SATURATION: 96 % | HEART RATE: 55 BPM | WEIGHT: 220 LBS | SYSTOLIC BLOOD PRESSURE: 140 MMHG | BODY MASS INDEX: 29.8 KG/M2 | TEMPERATURE: 97.4 F | DIASTOLIC BLOOD PRESSURE: 88 MMHG | HEIGHT: 72 IN | RESPIRATION RATE: 16 BRPM

## 2022-09-25 DIAGNOSIS — B96.89 ACUTE BACTERIAL SINUSITIS: Primary | ICD-10-CM

## 2022-09-25 DIAGNOSIS — J01.90 ACUTE BACTERIAL SINUSITIS: Primary | ICD-10-CM

## 2022-09-25 PROCEDURE — 4004F PT TOBACCO SCREEN RCVD TLK: CPT | Performed by: STUDENT IN AN ORGANIZED HEALTH CARE EDUCATION/TRAINING PROGRAM

## 2022-09-25 PROCEDURE — 3017F COLORECTAL CA SCREEN DOC REV: CPT | Performed by: STUDENT IN AN ORGANIZED HEALTH CARE EDUCATION/TRAINING PROGRAM

## 2022-09-25 PROCEDURE — G8417 CALC BMI ABV UP PARAM F/U: HCPCS | Performed by: STUDENT IN AN ORGANIZED HEALTH CARE EDUCATION/TRAINING PROGRAM

## 2022-09-25 PROCEDURE — G8427 DOCREV CUR MEDS BY ELIG CLIN: HCPCS | Performed by: STUDENT IN AN ORGANIZED HEALTH CARE EDUCATION/TRAINING PROGRAM

## 2022-09-25 PROCEDURE — 99213 OFFICE O/P EST LOW 20 MIN: CPT | Performed by: STUDENT IN AN ORGANIZED HEALTH CARE EDUCATION/TRAINING PROGRAM

## 2022-09-25 RX ORDER — AMOXICILLIN AND CLAVULANATE POTASSIUM 875; 125 MG/1; MG/1
1 TABLET, FILM COATED ORAL 2 TIMES DAILY
Qty: 14 TABLET | Refills: 0 | Status: SHIPPED | OUTPATIENT
Start: 2022-09-25 | End: 2022-10-02

## 2022-09-25 NOTE — PROGRESS NOTES
Urgent Care      Patient:  Mercy Mendoza 59 y.o. male     Date of Service: 21      Chief complaint:   Chief Complaint   Patient presents with    Sinusitis     Drainage, can't sleep; COVID +           History ofPresent Illness   The patient is a 59 y.o. male  presented to the clinic with complaints as above. Sinus congestion  -new issue  -started since he had covid 3 weeks ago  -currently, having a lot of congestion  -sinus congestion and pain worse on right side  -denies any fever or chills or SOB    Past Medical History:      Diagnosis Date    Cancer ([facility] [address]     Gastroesophageal reflux disease without esophagitis 2021    HTN (hypertension) 3/15/2019    Hyperlipidemia     Hypertension     New onset a-fib ([facility] [address] 2021    NHL (non-Hodgkin's lymphoma) ([facility] 2004    [facility]    Overactive thyroid gland        PastSurgical History:        Procedure Laterality Date    JOINT REPLACEMENT Right 2017    OTHER SURGICAL HISTORY  2012    lymph node extraction       Allergies:    Patient has no known allergies.     Social History:   Social History     Socioeconomic History    Marital status:      Spouse name: Not on file    Number of children: Not on file    Years of education: Not on file    Highest education level: Not on file   Occupational History    Not on file   Tobacco Use    Smoking status: Former     Packs/day: 1.00     Years: 33.00     Pack years: 33.00     Types: Cigarettes     Start date: 1970     Quit date: 2003     Years since quittin.7    Smokeless tobacco: Current     Types: Snuff   Vaping Use    Vaping Use: Never used   Substance and Sexual Activity    Alcohol use: Yes     Comment: weekends maybe 1-2 beers    Drug use: Yes     Frequency: 7.0 times per week     Types: Marijuana Harshal Dereje)     Comment: daily    Sexual activity: Not Currently     Partners: Female   Other Topics Concern    Not on file   Social History Narrative    Not on file     Social Determinants of Health     Financial Resource Strain: Not on file   Food Insecurity: Not on file   Transportation Needs: Not on file   Physical Activity: Not on file   Stress: Not on file   Social Connections: Not on file   Intimate Partner Violence: Not on file   Housing Stability: Not on file        Family History:       Problem Relation Age of Onset    Other Mother         Age 80, , healthy    Other Father          age 80 unknown cause    No Known Problems Sister        Review of Systems:   Review of Systems - as above     Physical Exam   Vitals: BP (!) 140/88   Pulse 55   Temp 97.4 °F (36.3 °C) (Temporal)   Resp 16   Ht 6' (1.829 m)   Wt 220 lb (99.8 kg)   SpO2 96%   BMI 29.84 kg/m²   Physical Exam  Constitutional:       Appearance: He is well-developed. HENT:      Head: Normocephalic and atraumatic. Nose: Mucosal edema and rhinorrhea present. Rhinorrhea is clear. Right Turbinates: Enlarged and swollen. Left Turbinates: Enlarged and swollen. Eyes:      General:         Right eye: No discharge. Left eye: No discharge. Conjunctiva/sclera: Conjunctivae normal.   Neck:      Trachea: No tracheal deviation. Cardiovascular:      Rate and Rhythm: Normal rate and regular rhythm. Heart sounds: Normal heart sounds. Pulmonary:      Effort: Pulmonary effort is normal. No respiratory distress. Breath sounds: Normal breath sounds. No wheezing. Abdominal:      General: Bowel sounds are normal. There is no distension. Palpations: Abdomen is soft. Tenderness: There is no abdominal tenderness. Musculoskeletal:         General: No tenderness. Cervical back: Normal range of motion and neck supple. Skin:     General: Skin is warm and dry. Neurological:      Mental Status: He is alert. Psychiatric:         Behavior: Behavior normal.           Assessment and Plan       1.  Acute bacterial sinusitis  New issue  -Given duration and symptoms, would treat with abx for bacterial infection as below, advised he call PCP if no improvement in his symptoms   - amoxicillin-clavulanate (AUGMENTIN) 875-125 MG per tablet; Take 1 tablet by mouth 2 times daily for 7 days  Dispense: 14 tablet; Refill: 0    Counseled regarding above diagnosis, including possible risks and complications,  especially if left uncontrolled. Counseled regarding the possible side effects, risks, benefits and alternatives to treatment;patient and/or guardian verbalizes understanding, agrees, feels comfortable with and wishes to proceed with above treatment plan. Call or go to [address] [place] if symptoms worsen or persist. Advised patient to call with any new medication issues, and, as applicable, read all Rx info from pharmacy to assure aware of all possible risks and side effects of medicationbefore taking. Patient and/or guardian given opportunity to ask questions/raise concerns. The patient verbalized comfort and understanding ofinstructions. I encourage further reading and education about your health conditions. Information on many health conditions is provided by [facility] [employer]: [email or web address]  Please bring any questions to me at your nextvisit. Return to Office: Return if symptoms worsen or fail to improve. Medication List:    Current Outpatient Medications   Medication Sig Dispense Refill    amoxicillin-clavulanate (AUGMENTIN) 875-125 MG per tablet Take 1 tablet by mouth 2 times daily for 7 days 14 tablet 0    Multiple Vitamins-Minerals (CENTRUM SILVER 50+MEN) TABS Take 1 tablet by mouth daily      metoprolol tartrate (LOPRESSOR) 25 MG tablet Take 0.5 tablets by mouth 2 times daily (Patient taking differently: Take 25 mg by mouth 2 times daily) 60 tablet 3    apixaban (ELIQUIS) 5 MG TABS tablet Take 1 tablet by mouth 2 times daily 60 tablet 1    rosuvastatin (CRESTOR) 5 MG tablet Take 5 mg by mouth daily   0     No current facility-administered medications for this visit. Akosua Mai, DO       This document may have been prepared at least partially through the use of voice recognition software. Although effort is taken to assure the accuracy ofthis document, it is possible that grammatical, syntax,  or spelling errors may occur.

## 2022-10-20 ENCOUNTER — TELEPHONE (OUTPATIENT)
Dept: CARDIOLOGY CLINIC | Age: 57
End: 2022-10-20

## 2022-10-20 RX ORDER — LISINOPRIL 20 MG/1
TABLET ORAL
COMMUNITY
Start: 2022-07-20

## 2022-10-20 NOTE — TELEPHONE ENCOUNTER
Patient scheduled for colonoscopy on 10/24/22. Recommended to hold Eliquis (3) days prior. Please advise.

## 2022-10-20 NOTE — PROGRESS NOTES
[facility] PRE-ADMISSION TESTING INSTRUCTIONS    The Preadmission Testing patient is instructed accordingly using the following criteria (check applicable):    ARRIVAL INSTRUCTIONS:  [x] Parking the day of Surgery is located in the Main Entrance lot. Upon entering the door, make an immediate right to the surgery reception desk    [x] Bring photo ID and insurance card    [] Bring in a copy of Living will or Durable Power of  papers. [x] Please be sure to arrange for responsible adult to provide transportation to and from the hospital    [x] Please arrange for responsible adult to be with you for the 24 hour period post procedure due to having anesthesia    [x] If you awake am of surgery not feeling well or have temperature >100 please call [phone]    GENERAL INSTRUCTIONS:    [x] Nothing by mouth after midnight, including gum, candy, mints or water    [x] You may brush your teeth, but do not swallow any water    [x] Take medications as instructed with 1-2 oz of water    [x] Stop herbal supplements and vitamins 5 days prior to procedure    [x] Follow preop dosing of blood thinners per physician instructions    [] Take 1/2 dose of evening insulin, but no insulin after midnight    [] No oral diabetic medications after midnight    [] If diabetic and have low blood sugar or feel symptomatic, take 1-2oz apple juice only    [] Bring inhalers day of surgery    [] Bring C-PAP/ Bi-Pap day of surgery    [] Bring urine specimen day of surgery    [x] Shower or bath with soap, lather and rinse well, AM of Surgery, no lotion, powders or creams to surgical site    [x] Follow bowel prep as instructed per surgeon    [x] No tobacco products within 24 hours of surgery     [x] No alcohol or illegal drug use within 24 hours of surgery.     [x] Jewelry, body piercing's, eyeglasses, contact lenses and dentures are not permitted into surgery (bring cases)      [x] Please do not wear any nail polish, make up or hair products on the day of surgery    [x] You can expect a call the business day prior to procedure to notify you if your arrival time changes    [x] If you receive a survey after surgery we would greatly appreciate your comments    [] Parent/guardian of a minor must accompany their child and remain on the premises  the entire time they are under our care     [] Pediatric patients may bring favorite toy, blanket or comfort item with them    [] A caregiver or family member must remain with the patient during their stay if they are mentally handicapped, have dementia, disoriented or unable to use a call light or would be a safety concern if left unattended    [x] Please notify surgeon if you develop any illness between now and time of surgery (cold, cough, sore throat, fever, nausea, vomiting) or any signs of infections  including skin, wounds, and dental.    [x]  The Outpatient Pharmacy is available to fill your prescription here on your day of surgery, ask your preop nurse for details    [] Other instructions    EDUCATIONAL MATERIALS PROVIDED:    [] PAT Preoperative Education Packet/Booklet     [] Medication List    [] Transfusion bracelet applied with instructions    [] Shower with soap, lather and rinse well, and use CHG wipes provided the evening before surgery as instructed    [] Incentive spirometer with instructions

## 2022-10-24 ENCOUNTER — ANESTHESIA (OUTPATIENT)
Dept: ENDOSCOPY | Age: 57
End: 2022-10-24
Payer: COMMERCIAL

## 2022-10-24 ENCOUNTER — ANESTHESIA EVENT (OUTPATIENT)
Dept: ENDOSCOPY | Age: 57
End: 2022-10-24
Payer: COMMERCIAL

## 2022-10-24 ENCOUNTER — HOSPITAL ENCOUNTER (OUTPATIENT)
Age: 57
Setting detail: OUTPATIENT SURGERY
Discharge: HOME | End: 2022-10-24
Attending: SURGERY | Admitting: SURGERY
Payer: COMMERCIAL

## 2022-10-24 VITALS
OXYGEN SATURATION: 100 % | SYSTOLIC BLOOD PRESSURE: 160 MMHG | TEMPERATURE: 97.8 F | HEIGHT: 72 IN | RESPIRATION RATE: 16 BRPM | WEIGHT: 228 LBS | DIASTOLIC BLOOD PRESSURE: 81 MMHG | HEART RATE: 51 BPM | BODY MASS INDEX: 30.88 KG/M2

## 2022-10-24 DIAGNOSIS — Z12.11 SPECIAL SCREENING FOR MALIGNANT NEOPLASMS, COLON: ICD-10-CM

## 2022-10-24 PROCEDURE — 6360000002 HC RX W HCPCS: Performed by: NURSE ANESTHETIST, CERTIFIED REGISTERED

## 2022-10-24 PROCEDURE — 2580000003 HC RX 258: Performed by: SURGERY

## 2022-10-24 PROCEDURE — 7100000010 HC PHASE II RECOVERY - FIRST 15 MIN: Performed by: SURGERY

## 2022-10-24 PROCEDURE — 88305 TISSUE EXAM BY PATHOLOGIST: CPT

## 2022-10-24 PROCEDURE — 2709999900 HC NON-CHARGEABLE SUPPLY: Performed by: SURGERY

## 2022-10-24 PROCEDURE — 3609010300 HC COLONOSCOPY W/BIOPSY SINGLE/MULTIPLE: Performed by: SURGERY

## 2022-10-24 PROCEDURE — 3700000000 HC ANESTHESIA ATTENDED CARE: Performed by: SURGERY

## 2022-10-24 PROCEDURE — 88305 TISSUE EXAM BY PATHOLOGIST: CPT | Performed by: ANESTHESIOLOGY

## 2022-10-24 PROCEDURE — 7100000011 HC PHASE II RECOVERY - ADDTL 15 MIN: Performed by: SURGERY

## 2022-10-24 PROCEDURE — 3700000001 HC ADD 15 MINUTES (ANESTHESIA): Performed by: SURGERY

## 2022-10-24 RX ORDER — PROPOFOL 10 MG/ML
INJECTION, EMULSION INTRAVENOUS PRN
Status: DISCONTINUED | OUTPATIENT
Start: 2022-10-24 | End: 2022-10-24 | Stop reason: SDUPTHER

## 2022-10-24 RX ORDER — SODIUM CHLORIDE 9 MG/ML
INJECTION, SOLUTION INTRAVENOUS CONTINUOUS
Status: DISCONTINUED | OUTPATIENT
Start: 2022-10-24 | End: 2022-10-24 | Stop reason: HOSPADM

## 2022-10-24 RX ADMIN — SODIUM CHLORIDE: 9 INJECTION, SOLUTION INTRAVENOUS at 12:06

## 2022-10-24 RX ADMIN — PROPOFOL 270 MG: 10 INJECTION, EMULSION INTRAVENOUS at 12:08

## 2022-10-24 ASSESSMENT — ENCOUNTER SYMPTOMS
VOMITING: 0
NAUSEA: 0
ABDOMINAL PAIN: 0
SHORTNESS OF BREATH: 0
DIARRHEA: 0
CONSTIPATION: 0
ABDOMINAL DISTENTION: 0

## 2022-10-24 ASSESSMENT — LIFESTYLE VARIABLES: SMOKING_STATUS: 0

## 2022-10-24 ASSESSMENT — PAIN - FUNCTIONAL ASSESSMENT: PAIN_FUNCTIONAL_ASSESSMENT: 0-10

## 2022-10-24 NOTE — H&P
Patient was referred by Lynnette Proctor MD.  Patient's primary care provider is Lynnette Proctor MD.  CC: Patient presents for colon cancer screening. HPI: Colorectal cancer screening, but denies appetite change, weight loss, colitis, colon cancer, colon polyps, constipation, diarrhea, diverticulitis, diverticulosis, family history of colon cancer, hemorrhoids and rectal bleeding. All      Meds Prior to Visit:  Lisinopril     Eliquis     Crestor     Metoprolol Succinate ER         Allergies:  NKDA     PMH:  Problem List: Screening for malignant neoplasm of colon  Health Maintenance:  Colonoscopy - (2004)  Medical Problems:  Non Hodgkins Lymphoma  Surgical Hx:  Lymphnodes Removed - [facility]/2013/2015/2018  Reviewed and updated. FH:  Father:  . (Hx)  Mother:  Heart Disease. Reviewed and updated. SH:  Personal Habits:  Tobacco Use: Patient is a former smoker. Alcohol: Current Alcohol Use; Social.. (Daily Caffeine)  Reviewed and updated. Date: 04/19/2022  Was the patient queried about smoking behavior? Yes  No  Does the patient currently smoke? Tobacco Use: Patient is a former smoker. ROS:  Const: Denies anorexia, anxiety, fatigue, night sweats, weight gain and weight loss. Eyes: Denies eye symptoms. ENMT: Denies ear symptoms. Denies nasal symptoms. Denies mouth or throat symptoms. CV: Denies hypertension and other cardiovascular symptoms. Resp: Denies respiratory symptoms. GI: Denies hepatitis, liver disease and other gastrointestinal symptoms. Musculo: Denies musculoskeletal symptoms. Skin: Denies skin, hair and nail symptoms. Breast: Denies breast problems. Neuro: Denies neurologic symptoms. Psych: Denies depression and substance abuse. Endocrine: Denies diabetes, kidney disease and thyroid disease. Hema/Lymph: Denies anemia, blood disease, cancer and past transfusion. Allergy/Immuno: Denies immunosuppression. Reviewed and updated. Exam:  Const: Appears healthy and well developed.  No signs of apparent distress present. Head/Face: Atraumatic, normocephalic on inspection. Eyes: Conjunctivae pink. Pupils equal, round and reactive to light. Sclerae clear and anicteric. ENMT: External ears WNL. External nose WNL. Lips: Appear normal and healthy. Neck: Normal to inspection. Normal to palpation. No masses appreciated. Trachea midline. Thyroid is normal in size. No jugular venous distention. Resp: Respiration rate is normal. No wheezing. Clear to auscultation bilaterally. No rales or rhonchi appreciated over the lungs bilaterally. CV: Rate is regular. Rhythm is regular. S1 is normal. S2 is normal. No heart murmur appreciated. Extremities: No clubbing or cyanosis. No edema of the lower limbs bilaterally. Abdomen: No visible herniations. No abdominal scars. Positive bowel sounds in all quadrants. Abdomen is soft, nontender, and nondistended without guarding, rigidity or rebound tenderness. No palpable abdominal aneurysms present. No palpable hepatosplenomegaly. Lymph: No palpable lymphadenopathy in the cervical, supraclavicular, axillary and inguinal region(s). Musculo: Walks with a normal gait. Upper Extremities: Normal to inspection. ROM: Full ROM bilaterally. Lower Extremities: Normal to inspection. ROM: Full ROM bilaterally. Skin: Skin warm and dry with no evidence of unusual rashes or suspicious lesions. Neuro: Alert and oriented x3. Mood is normal.  Cranial Nerves: Cranial nerves II-XII grossly intact. Psych: Cognition: Judgment and insight are grossly intact.                     Assessment #1: Hx Z12.11 Encounter for screening for malignant neoplasm of colon   Care Plan:              Comments       :  Screening colonoscopy  Risks, benefits, alternatives, complications, need for barium enema as well as colonic perforation all discussed      Risks, benefits, alternatives, complications all discussed  Decision-making was moderate in complexity  Approximately 30 minutes spent reviewing information, data provided/obtained prior to office visit, obtaining information (history and physical) during office visit, in order to coordinate care regarding goals of care, diagnosis and prognosis

## 2022-10-24 NOTE — PROGRESS NOTES
Up to BR,  did pass gas, verbalizes feeling better, discharge and anesthesia instructions given to patient and family, any questions addressed.

## 2022-10-24 NOTE — OP NOTE
COLONOSCOPY PROCEDURE NOTE    DATE OF PROCEDURE: 10/24/2022    PREOPERATIVE DIAGNOSIS:  screening     POSTOPERATIVE DIAGNOSIS/FINDINGS:  mucosal changes mainly in ascending but found throughout     SURGEON: Rossi Walker MD    ASSISTANT: None    OPERATION: Total Colonoscopy forceps bx     ANESTHESIA: Local monitored anesthesia. CONDITION: Stable    COMPLICATIONS: None. Specimens Removed: as above    EBL: None      BRIEF HISTORY:  This is a 59 y.o. male who presents with the complaint of screening. It was recommended the patient undergo a colonoscopy. The risks/benefits/alternatives/expected outcomes were explained the the patient. The patient verbalized understanding and agreed to proceed. PROCEDURE:  The patient was brought into the endoscopy suite and placed in the left lateral decubitus position. A digital rectal exam was performed after the initiation of LMAC anesthesia and failed to reveal any obstructing masses or lesions. A colonoscope was inserted into the patient's anus and passed through the rectum, sigmoid, descending, transverse, and ascending colon all the way to the level of the cecum. Visualization of the cecum was confirmed by visualization of the ileo-cecal valve, confluence of the tinea, and by visualization of the light in the RLQ on the anterior abdominal wall. The scope was then withdrawn the entire length of the colon. There were no masses, polyps, or lesions noted. Mucosal changes found throughout and bx. Upon reaching the anus, the scope was retroflexed. There were no significant hemorrhoids noted. The scope was straightened and withdrawn entirely. The patient tolerated the procedure well and there were no complications.         Rossi Walker MD, MD  10/24/2022

## 2022-10-24 NOTE — ANESTHESIA POSTPROCEDURE EVALUATION
Department of Anesthesiology  Postprocedure Note    Patient: Zana Voss  MRN: [id number]  YOB: 1965  Date of evaluation: 10/24/2022      Procedure Summary     Date: 10/24/22 Room / Location: [facility] 02 / [facility]    Anesthesia Start: 1206 Anesthesia Stop: 1224    Procedure: COLONOSCOPY WITH BIOPSY Diagnosis:       Special screening for malignant neoplasms, colon      (Special screening for malignant neoplasms, colon [Z12.11])    Surgeons: Joaquin Choudhary MD Responsible Provider: Dexter Shankar MD    Anesthesia Type: MAC ASA Status: 3          Anesthesia Type: No value filed.     Nancy Phase I: Nancy Score: 10    Nancy Phase II:        Anesthesia Post Evaluation    Patient location during evaluation: bedside (Endo)  Patient participation: complete - patient participated  Level of consciousness: awake and alert  Airway patency: patent  Nausea & Vomiting: no nausea and no vomiting  Complications: no  Cardiovascular status: hemodynamically stable  Respiratory status: acceptable  Hydration status: stable

## 2022-10-24 NOTE — H&P
GENERAL SURGERY  H&P NOTE  10/24/2022      HPI  Cleo Paniagua is a 59 y.o. male with a past medical history of non-hodgkin's lymphoma, A fib on eliquis, GERD, HTN, HLD who presents for evaluation of screening colonoscopy. Patient states he tolerated his prep well and was mostly clear by the time the prep was done. Denies any nausea or vomiting. No episodes of melena or bloody bowel movements recently. He has had loose stools for the past couple of months ever since he caught COVID and was treated with some augmentin. He does have a history of EGD and colonoscopy in 2004 after his diagnosis with Non-hodgkin's lymphoma. These were unremarkable as far as he remembers. Past Medical History:   Diagnosis Date    Cancer [facility])     Colon cancer screening     Gastroesophageal reflux disease without esophagitis 01/02/2021    HTN (hypertension) 03/15/2019    Hyperlipidemia     Hypertension     New onset a-fib ([facility] [address] 01/02/2021    NHL (non-Hodgkin's lymphoma) ([facility] [address] 2004    [facility]       Past Surgical History:   Procedure Laterality Date    COLONOSCOPY  2003    JOINT REPLACEMENT Right 2017    OTHER SURGICAL HISTORY  2012    lymph node extraction       Medications Prior to Admission    Prior to Admission medications    Medication Sig Start Date End Date Taking?  Authorizing Provider   lisinopril (PRINIVIL;ZESTRIL) 20 MG tablet  7/20/22   Historical Provider, MD   Multiple Vitamins-Minerals (CENTRUM SILVER 50+MEN) TABS Take 1 tablet by mouth daily  Patient not taking: Reported on 10/20/2022    Historical Provider, MD   metoprolol tartrate (LOPRESSOR) 25 MG tablet Take 0.5 tablets by mouth 2 times daily  Patient taking differently: Take 25 mg by mouth 2 times daily 1/3/21   Ulysses Navuma P[place],    apixaban (ELIQUIS) 5 MG TABS tablet Take 1 tablet by mouth 2 times daily 1/3/21   Ulysses Navy [place],    rosuvastatin (CRESTOR) 5 MG tablet Take 5 mg by mouth daily  1/7/19   Historical Provider, MD       No Known Allergies    Family History

## 2022-10-24 NOTE — ANESTHESIA PRE PROCEDURE
Department of Anesthesiology  Preprocedure Note       Name:  Flaquita Springer   Age:  59 y.o.  :  1965                                          MRN:  [id number]         Date:  10/24/2022      Surgeon: Car Gutierres):  Samantha Ashford MD    Procedure: Procedure(s):  COLORECTAL CANCER SCREENING, NOT HIGH RISK    Medications prior to admission:   Prior to Admission medications    Medication Sig Start Date End Date Taking?  Authorizing Provider   lisinopril (PRINIVIL;ZESTRIL) 20 MG tablet  22   Historical Provider, MD   Multiple Vitamins-Minerals (CENTRUM SILVER 50+MEN) TABS Take 1 tablet by mouth daily  Patient not taking: Reported on 10/20/2022    Historical Provider, MD   metoprolol tartrate (LOPRESSOR) 25 MG tablet Take 0.5 tablets by mouth 2 times daily  Patient taking differently: Take 25 mg by mouth 2 times daily 1/3/21   Becca Pennington DO   apixaban (ELIQUIS) 5 MG TABS tablet Take 1 tablet by mouth 2 times daily 1/3/21   Becca Pennington DO   rosuvastatin (CRESTOR) 5 MG tablet Take 5 mg by mouth daily  19   Historical Provider, MD       Current medications:    Current Facility-Administered Medications   Medication Dose Route Frequency Provider Last Rate Last Admin    0.9 % sodium chloride infusion   IntraVENous Continuous Samantha Ashford MD           Allergies:  No Known Allergies    Problem List:    Patient Active Problem List   Diagnosis Code    HTN (hypertension) I10    History of nuclear stress test Z92.89    New onset a-fib ([facility] [address] I48.91    HLD (hyperlipidemia) E78.5    Gastroesophageal reflux disease without esophagitis K21.9    NHL (non-Hodgkin's lymphoma) ([facility] [address] C85.90    Pre-syncope R55       Past Medical History:        Diagnosis Date    Cancer ([facility] [address]     Colon cancer screening     Gastroesophageal reflux disease without esophagitis 2021    HTN (hypertension) 03/15/2019    Hyperlipidemia     Hypertension     New onset a-fib ([address] 2021    [facility]Hodgkin's lymphoma[facility]       Past Surgical History:        Procedure Laterality Date    COLONOSCOPY  2003    JOINT REPLACEMENT Right 2017    OTHER SURGICAL HISTORY  2012    lymph node extraction       Social History:    Social History     Tobacco Use    Smoking status: Former     Packs/day: 1.00     Years: 33.00     Pack years: 33.00     Types: Cigarettes     Start date: 1970     Quit date: 2003     Years since quittin.8    Smokeless tobacco: Current     Types: Snuff   Substance Use Topics    Alcohol use: Yes     Comment: weekends maybe 1-2 beers                                Ready to quit: Not Answered  Counseling given: Not Answered      Vital Signs (Current):   Vitals:    10/20/22 1418   Weight: 228 lb (103.4 kg)   Height: 6' (1.829 m)                                              BP Readings from Last 3 Encounters:   22 (!) 140/88   22 126/80   22 118/72       NPO Status:                                                                                 BMI:   Wt Readings from Last 3 Encounters:   10/20/22 228 lb (103.4 kg)   22 220 lb (99.8 kg)   22 235 lb (106.6 kg)     Body mass index is 30.92 kg/m².     CBC:   Lab Results   Component Value Date/Time    WBC 7.8 2022 09:26 AM    RBC 5.00 2022 09:26 AM    HGB 15.3 2022 09:26 AM    HCT 47.4 2022 09:26 AM    MCV 94.8 2022 09:26 AM    RDW 14.0 2022 09:26 AM     2022 09:26 AM       CMP:   Lab Results   Component Value Date/Time     2022 09:26 AM    K 4.2 2022 09:26 AM     2022 09:26 AM    CO2 25 2022 09:26 AM    BUN 20 2022 09:26 AM    CREATININE 0.9 2022 09:26 AM    GFRAA >60 2022 09:26 AM    LABGLOM >60 2022 09:26 AM    GLUCOSE 75 2022 09:26 AM    PROT 6.7 2022 09:26 AM    CALCIUM 8.8 2022 09:26 AM    BILITOT 0.6 2022 09:26 AM    ALKPHOS 72 2022 09:26 AM    AST 21 2022 09:26 AM ALT 41 01/12/2022 09:26 AM       POC Tests: No results for input(s): POCGLU, POCNA, POCK, POCCL, POCBUN, POCHEMO, POCHCT in the last 72 hours. Coags:   Lab Results   Component Value Date/Time    PROTIME 13.0 04/08/2021 08:17 AM    INR 1.2 04/08/2021 08:17 AM       HCG (If Applicable): No results found for: PREGTESTUR, PREGSERUM, HCG, HCGQUANT     ABGs: No results found for: PHART, PO2ART, BPD4ARF, WNQ9ZDG, BEART, U8NFSKYV     Type & Screen (If Applicable):  No results found for: LABABO, LABRH    Drug/Infectious Status (If Applicable):  No results found for: HIV, HEPCAB    COVID-19 Screening (If Applicable):   Lab Results   Component Value Date/Time    COVID19 Not Detected 01/12/2022 01:12 PM    COVID19 Not Detected 11/18/2020 09:57 AM           Anesthesia Evaluation  Patient summary reviewed and Nursing notes reviewed no history of anesthetic complications:   Airway: Mallampati: III  TM distance: >3 FB   Neck ROM: full  Mouth opening: > = 3 FB   Dental: normal exam         Pulmonary:Negative Pulmonary ROS breath sounds clear to auscultation      (-) not a current smoker                           Cardiovascular:  Exercise tolerance: good (>4 METS),   (+) hypertension:, dysrhythmias: atrial fibrillation, hyperlipidemia        Rhythm: regular  Rate: normal           Beta Blocker:  Dose within 24 Hrs         Neuro/Psych:   Negative Neuro/Psych ROS              GI/Hepatic/Renal:   (+) GERD: well controlled, bowel prep,           Endo/Other:    (+) blood dyscrasia: anticoagulation therapy:., malignancy/cancer. Abdominal:             Vascular: negative vascular ROS. Other Findings:           Anesthesia Plan      MAC     ASA 3       Induction: intravenous. Anesthetic plan and risks discussed with patient and sibling. Jennifer Nelson MD   10/24/2022    Agree with above note.   KLEVER Candelaria - CRNA

## 2022-10-24 NOTE — DISCHARGE INSTRUCTIONS
Follow up in 2 weeks  Call for appointment         Learning About Colonoscopy  What is a colonoscopy? A colonoscopy is a test (also called a procedure) that lets a doctor look inside your large intestine. The doctor uses a thin, lighted tube called a colonoscope. The doctor uses it to look for small growths called polyps, colon or rectal cancer (colorectal cancer), or other problems like bleeding. During the procedure, the doctor can take samples of tissue. The samples can then be checked for cancer or other conditions. The doctor can also take out polyps. How is a colonoscopy done? This procedure is done in a doctor's office or a clinic or hospital. You will get medicine to help you relax and not feel pain. Some people find that they don't remember having the test because of the medicine. The doctor gently moves the colonoscope, or scope, through the colon. The scope is also a small video camera. It lets the doctor see the colon and take pictures. How do you prepare for the procedure? You need to clean out your colon before the procedure so the doctor can see your colon. This depends on which \"colon prep\" your doctor recommends. To clean out your colon, you'll do a \"colon prep\" before the test. This means you stop eating solid foods and drink only clear liquids. You can have water, tea, coffee, clear juices, clear broths, flavored ice pops, and gelatin (such as Jell-O). Do not drink anything red or purple. The day or night before the procedure, you drink a large amount of a special liquid. This causes loose, frequent stools. You will go to the bathroom a lot. Your doctor may have you drink part of the liquid the evening before and the rest on the day of the test. It's very important to drink all of the liquid. If you have problems drinking it, call your doctor. Arrange to have someone take you home after the test.  What can you expect after a colonoscopy?   Your doctor will tell you when you can eat and do your usual activities. Drink a lot of fluid after the test to replace the fluids you may have lost during the colon prep. But don't drink alcohol. Your doctor will talk to you about when you'll need your next colonoscopy. The results of your test and your risk for colorectal cancer will help your doctor decide how often you need to be checked. After the test, you may be bloated or have gas pains. You may need to pass gas. If a biopsy was done or a polyp was removed, you may have streaks of blood in your stool (feces) for a few days. Check with your doctor to see when it is safe to take aspirin and nonsteroidal anti-inflammatory drugs (NSAIDs) again. Problems such as heavy rectal bleeding may not occur until several weeks after the test. This isn't common. But it can happen after polyps are removed. Follow-up care is a key part of your treatment and safety. Be sure to make and go to all appointments, and call your doctor if you are having problems. It's also a good idea to know your test results and keep a list of the medicines you take. Where can you learn more? Go to [email or web address] and sign in to your [facility] account. Enter I881 in the [employer] box to learn more about \"Learning About Colonoscopy. \"     If you do not have an account, please click on the \"Sign Up Now\" link. Current as of: May 4, 2022               Content Version: 13.4  © 4446-1382 [employer]. Care instructions adapted under license by [facility] [address] If you have questions about a medical condition or this instruction, always ask your healthcare professional. [place] [address] any warranty or liability for your use of this information.

## 2023-09-22 ENCOUNTER — OFFICE VISIT (OUTPATIENT)
Dept: FAMILY MEDICINE CLINIC | Age: 58
End: 2023-09-22
Payer: COMMERCIAL

## 2023-09-22 VITALS
DIASTOLIC BLOOD PRESSURE: 80 MMHG | TEMPERATURE: 97.5 F | BODY MASS INDEX: 32.51 KG/M2 | HEIGHT: 72 IN | OXYGEN SATURATION: 96 % | SYSTOLIC BLOOD PRESSURE: 126 MMHG | HEART RATE: 69 BPM | WEIGHT: 240 LBS

## 2023-09-22 DIAGNOSIS — R21 RASH AND NONSPECIFIC SKIN ERUPTION: Primary | ICD-10-CM

## 2023-09-22 PROCEDURE — G8427 DOCREV CUR MEDS BY ELIG CLIN: HCPCS | Performed by: PHYSICIAN ASSISTANT

## 2023-09-22 PROCEDURE — 3079F DIAST BP 80-89 MM HG: CPT | Performed by: PHYSICIAN ASSISTANT

## 2023-09-22 PROCEDURE — 99203 OFFICE O/P NEW LOW 30 MIN: CPT | Performed by: PHYSICIAN ASSISTANT

## 2023-09-22 PROCEDURE — 4004F PT TOBACCO SCREEN RCVD TLK: CPT | Performed by: PHYSICIAN ASSISTANT

## 2023-09-22 PROCEDURE — 3074F SYST BP LT 130 MM HG: CPT | Performed by: PHYSICIAN ASSISTANT

## 2023-09-22 PROCEDURE — 3017F COLORECTAL CA SCREEN DOC REV: CPT | Performed by: PHYSICIAN ASSISTANT

## 2023-09-22 PROCEDURE — 96372 THER/PROPH/DIAG INJ SC/IM: CPT | Performed by: PHYSICIAN ASSISTANT

## 2023-09-22 PROCEDURE — G8417 CALC BMI ABV UP PARAM F/U: HCPCS | Performed by: PHYSICIAN ASSISTANT

## 2023-09-22 RX ORDER — METHYLPREDNISOLONE ACETATE 80 MG/ML
80 INJECTION, SUSPENSION INTRA-ARTICULAR; INTRALESIONAL; INTRAMUSCULAR; SOFT TISSUE ONCE
Status: COMPLETED | OUTPATIENT
Start: 2023-09-22 | End: 2023-09-22

## 2023-09-22 RX ORDER — PREDNISONE 10 MG/1
TABLET ORAL
Qty: 18 TABLET | Refills: 0 | Status: SHIPPED | OUTPATIENT
Start: 2023-09-22

## 2023-09-22 RX ADMIN — METHYLPREDNISOLONE ACETATE 80 MG: 80 INJECTION, SUSPENSION INTRA-ARTICULAR; INTRALESIONAL; INTRAMUSCULAR; SOFT TISSUE at 09:00

## 2023-09-22 NOTE — PROGRESS NOTES
23  Avery Hoffmann : 1965 Sex: male  Age 59 y.o. Subjective:  Chief Complaint   Patient presents with    Poison Ivy     All over body. Started . Itchy          HPI:   HPI  Avery Hoffmann , 59 y.o. male presents to [facility] care for evaluation of poison ivy. The patient was pulling some shrubs out for a neighbor. The patient has developed a rash to the upper and lower extremities. It does itch rather intensely. Started on . The patient Fran Quiet difficulty breathing. No chest pain, shortness of breath, fever, chills. ROS:   Unless otherwise stated in this report the patient's positive and negative responses for review of systems for constitutional, eyes, ENT, cardiovascular, respiratory, gastrointestinal, neurological, , musculoskeletal, and integument systems and related systems to the presenting problem are either stated in the history of present illness or were not pertinent or were negative for the symptoms and/or complaints related to the presenting medical problem. Positives and pertinent negatives as per HPI. All others reviewed and are negative.       PMH:     Past Medical History:   Diagnosis Date    Cancer [facility])     Colon cancer screening     Gastroesophageal reflux disease without esophagitis 2021    HTN (hypertension) 03/15/2019    Hyperlipidemia     Hypertension     New onset a-fib ([address]) 2021    NHL (non-Hodgkin's lymphoma) ([address] [facility]) 2004    [facility]       Past Surgical History:   Procedure Laterality Date    COLONOSCOPY      COLONOSCOPY N/A 10/24/2022    COLONOSCOPY WITH BIOPSY performed by Bruce Waddell MD at [facility] Right 2017    OTHER SURGICAL HISTORY  2012    lymph node extraction       Family History   Problem Relation Age of Onset    Other Mother         Age 80, , healthy    Other Father          age 80 unknown cause    No Known Problems Sister        Medications:     Current Outpatient Medications:

## (undated) DEVICE — FORCEPS BX L240CM JAW DIA2.4MM ORNG L CAP W/ NDL DISP RAD

## (undated) DEVICE — GRADUATE TRIANG MEASURE 1000ML BLK PRNT

## (undated) DEVICE — SPONGE GZ W4XL4IN RAYON POLY FILL CVR W/ NONWOVEN FAB